# Patient Record
Sex: FEMALE | Race: WHITE | NOT HISPANIC OR LATINO | Employment: OTHER | URBAN - METROPOLITAN AREA
[De-identification: names, ages, dates, MRNs, and addresses within clinical notes are randomized per-mention and may not be internally consistent; named-entity substitution may affect disease eponyms.]

---

## 2017-03-01 NOTE — TELEPHONE ENCOUNTER
Refill done, patient needs an appointment to discuss better plan.   Her diabetes is NOT under control.  I called but there is no voice mail to leave a message.       Mainor Jane MD  Diplomat, [facility]  [address] [place] [phone]

## 2017-03-02 NOTE — TELEPHONE ENCOUNTER
Sent My chart message to patient    Message: My Chart message sent.    Left Message for patient to call back: yes

## 2017-04-04 RX ORDER — SIMVASTATIN 20 MG
TABLET ORAL
Qty: 90 TAB | Refills: 0 | Status: SHIPPED | OUTPATIENT
Start: 2017-04-04 | End: 2017-04-10 | Stop reason: SDUPTHER

## 2017-04-10 ENCOUNTER — OFFICE VISIT (OUTPATIENT)
Dept: MEDICAL GROUP | Age: 67
End: 2017-04-10
Payer: MEDICARE

## 2017-04-10 VITALS
TEMPERATURE: 98.2 F | OXYGEN SATURATION: 96 % | BODY MASS INDEX: 28.16 KG/M2 | WEIGHT: 153 LBS | SYSTOLIC BLOOD PRESSURE: 128 MMHG | HEIGHT: 62 IN | DIASTOLIC BLOOD PRESSURE: 76 MMHG | HEART RATE: 82 BPM

## 2017-04-10 DIAGNOSIS — Z78.0 POSTMENOPAUSAL: ICD-10-CM

## 2017-04-10 DIAGNOSIS — E78.00 HYPERCHOLESTEREMIA: ICD-10-CM

## 2017-04-10 DIAGNOSIS — Z12.31 VISIT FOR SCREENING MAMMOGRAM: ICD-10-CM

## 2017-04-10 DIAGNOSIS — E11.21 CONTROLLED TYPE 2 DIABETES MELLITUS WITH DIABETIC NEPHROPATHY, UNSPECIFIED LONG TERM INSULIN USE STATUS: ICD-10-CM

## 2017-04-10 DIAGNOSIS — Z00.00 PREVENTATIVE HEALTH CARE: ICD-10-CM

## 2017-04-10 DIAGNOSIS — E11.8 CONTROLLED DIABETES MELLITUS TYPE 2 WITH COMPLICATIONS, UNSPECIFIED LONG TERM INSULIN USE STATUS: ICD-10-CM

## 2017-04-10 DIAGNOSIS — E11.21 CONTROLLED TYPE 2 DIABETES MELLITUS WITH DIABETIC NEPHROPATHY, WITHOUT LONG-TERM CURRENT USE OF INSULIN (HCC): ICD-10-CM

## 2017-04-10 PROCEDURE — 99214 OFFICE O/P EST MOD 30 MIN: CPT | Performed by: FAMILY MEDICINE

## 2017-04-10 RX ORDER — SIMVASTATIN 20 MG
TABLET ORAL
Qty: 90 TAB | Refills: 0 | Status: SHIPPED | OUTPATIENT
Start: 2017-04-10 | End: 2017-07-14

## 2017-04-10 NOTE — MR AVS SNAPSHOT
"        Nery Brady   4/10/2017 5:00 PM   Office Visit   MRN: [id number]    Department:  [address] [place] [address]   Dept Phone:  [phone]    Description:  Female : 1950   Provider:  Magaly Jane M.D.           Allergies as of 4/10/2017     No Known Allergies      You were diagnosed with     Hypercholesteremia   [290186]       Controlled type 2 diabetes mellitus with diabetic nephropathy, unspecified long term insulin use status (CMS-HCC)   [2274031]       Postmenopausal   [948711]       Preventative health care   [799220]         Vital Signs     Blood Pressure Pulse Temperature Height Weight Body Mass Index    128/76 mmHg 82 36.8 °C (98.2 °F) 1.575 m (5' 2.01\") 69.4 kg (153 lb) 27.98 kg/m2    Oxygen Saturation Smoking Status                96% Never Smoker           Basic Information     Date Of Birth Sex Race Ethnicity Preferred Language    1950 Female White Non- English      Your appointments     Jul 10, 2017  8:40 AM   Established Patient with Magaly Jane M.D.   [address] [id number] [phone]           You will be receiving a confirmation call a few days before your appointment from our automated call confirmation system.              Problem List              ICD-10-CM Priority Class Noted - Resolved    Vitamin d deficiency    2010 - Present    Hypercholesteremia E78.00   2010 - Present    Osteopenia M85.80   2010 - Present    Abnormal mammogram of left breast R92.8   2015 - Present    Postmenopausal Z78.0   2016 - Present    Visit for screening mammogram Z12.31   2016 - Present    Need for prophylactic vaccination with combined vaccine Z23   2016 - Present    Diabetes mellitus type 2, controlled (CMS-HCC) E11.9   2016 - Present    Preventative health care Z00.00   4/10/2017 - Present      Health Maintenance        Date Due Completion Dates    RETINAL " SCREENING 5/4/1967 ---    IMM ZOSTER VACCINE 5/4/2009 ---    DIABETES MONOFILAMENT / LE EXAM 7/30/2014 7/30/2013    BONE DENSITY 8/23/2015 8/23/2010, 1/11/2008    URINE ACR / MICROALBUMIN 10/1/2015 10/1/2014, 4/25/2013, 2/9/2012, 8/3/2010    MAMMOGRAM 5/17/2016 11/17/2015, 5/13/2015, 1/11/2008, 1/11/2008, 9/20/2007, 9/20/2007, 4/25/2006, 5/12/2004    A1C SCREENING 4/14/2017 10/14/2016, 8/8/2016, 6/11/2015, 10/1/2014, 10/30/2013, 7/30/2013, 4/25/2013, 11/28/2012, 5/18/2012, 2/9/2012, 7/25/2011, 4/21/2011, 8/3/2010    FASTING LIPID PROFILE 10/14/2017 10/14/2016, 10/1/2014, 2/9/2012, 8/3/2010    SERUM CREATININE 10/14/2017 10/14/2016, 10/1/2014, 11/28/2012, 2/9/2012, 4/22/2005    COLONOSCOPY 10/2/2024 10/2/2014    IMM DTaP/Tdap/Td Vaccine (3 - Td) 6/23/2026 6/23/2016, 6/4/2013            Current Immunizations     13-VALENT PCV PREVNAR 10/10/2015    Hepatitis B Vaccine Recombivax (Adol/Adult)  Deferred (Patient Schedule)    Influenza TIV (IM) 10/10/2015, 11/14/2014    Pneumococcal polysaccharide vaccine (PPSV-23) 7/18/2014    Tdap Vaccine 6/23/2016, 6/4/2013  8:30 PM      Below and/or attached are the medications your provider expects you to take. Review all of your home medications and newly ordered medications with your provider and/or pharmacist. Follow medication instructions as directed by your provider and/or pharmacist. Please keep your medication list with you and share with your provider. Update the information when medications are discontinued, doses are changed, or new medications (including over-the-counter products) are added; and carry medication information at all times in the event of emergency situations     Allergies:  No Known Allergies          Medications  Valid as of: April 10, 2017 -  5:26 PM    Generic Name Brand Name Tablet Size Instructions for use    Aspirin (Chew Tab) ASA 81 MG Take 1 Tab by mouth every day.        GlipiZIDE (Tab) GLUCOTROL 10 MG TAKE 1 TABLET BY MOUTH TWICE A DAY         Lisinopril (Tab) PRINIVIL 5 MG TAKE 1 TABLET ORALLY EVERY DAY        MetFORMIN HCl (Tab) GLUCOPHAGE 500 MG Take 500 mg by mouth 2 times a day, with meals.        MetFORMIN HCl (Tab) GLUCOPHAGE 1000 MG TAKE 1 TABLET BY MOUTH TWICE A DAY WITH MEALS *DNFU 08/10        MetFORMIN HCl (Tab) GLUCOPHAGE 1000 MG         Misc. Devices (Misc) Misc. Devices  by Does not apply route. DIABETIC TEST STRIPS AND LANCETS    SIG:  USE AS DIRECTED        Simvastatin (Tab) ZOCOR 20 MG Take 1 Tab by mouth every evening.        Simvastatin (Tab) ZOCOR 20 MG TAKE 1 TABLET BY MOUTH EVERY DAY IN THE EVENING        Simvastatin (Tab) ZOCOR 20 MG TAKE ONE TABLET BY MOUTH ONCE DAILY IN THE EVENING        .                 Medicines prescribed today were sent to:     [facility] [address] [place] [address], [place] - [address]    [address] [place] [address]    Phone: [phone] Fax: [phone]    Open 24 Hours?: No      Medication refill instructions:       If your prescription bottle indicates you have medication refills left, it is not necessary to call your provider’s office. Please contact your pharmacy and they will refill your medication.    If your prescription bottle indicates you do not have any refills left, you may request refills at any time through one of the following ways: The online [email or web address] system (except Urgent Care), by calling your provider’s office, or by asking your pharmacy to contact your provider’s office with a refill request. Medication refills are processed only during regular business hours and may not be available until the next business day. Your provider may request additional information or to have a follow-up visit with you prior to refilling your medication.   *Please Note: Medication refills are assigned a new Rx number when refilled electronically. Your pharmacy may indicate that no refills were authorized even though a new prescription for the same medication is available at the pharmacy. Please  request the medicine by name with the pharmacy before contacting your provider for a refill.        Your To Do List     Future Labs/Procedures Complete By Expires    CBC WITH DIFFERENTIAL  As directed 4/10/2018    COMP METABOLIC PANEL  As directed 4/10/2018    DS-BONE DENSITY STUDY (DEXA)  As directed 4/10/2018    HEMOGLOBIN A1C  As directed 4/10/2018    LIPID PROFILE  As directed 4/10/2018    MICROALBUMIN CREAT RATIO URINE  As directed 4/10/2018         MyChart Access Code: Activation code not generated  Current Alerehart Status: Active

## 2017-04-11 RX ORDER — SIMVASTATIN 20 MG
20 TABLET ORAL
Qty: 90 TAB | Refills: 0 | Status: SHIPPED | OUTPATIENT
Start: 2017-04-11 | End: 2017-07-14

## 2017-04-11 RX ORDER — LISINOPRIL 5 MG/1
5 TABLET ORAL
Qty: 90 TAB | Refills: 0 | Status: SHIPPED | OUTPATIENT
Start: 2017-04-11 | End: 2018-06-29 | Stop reason: SDUPTHER

## 2017-04-11 NOTE — ASSESSMENT & PLAN NOTE
On a simvastatin 20mg po qhs  The patient has been  tolerating this fine. The patient denies any muscle aches, no abdominal pain and no history of elevated liver enzymes.  Results for CARLTON SANTOS (MRN [id number]) as of 4/10/2017 18:08   Ref. Range 8/8/2016 13:25 10/14/2016 11:12   Glycohemoglobin Latest Ref Range: 4.8-5.6 % 7.7 8.1 (H)

## 2017-04-11 NOTE — ASSESSMENT & PLAN NOTE
Taking metmorfin 1000mg po bid  Glipizide 10mg po bid  She is on a statin,  Did not start her daily baby aspirin Nor her lisinopril  Last HBA1c   The patient denies  any polydipsia, polyuria, polyphagia, weight loss, no numbness or tingling anywhere, no changes in vision.  Due for monofilament, urine microalbumin, and retinal screening    Results for CARLTON SANTOS TOVA (MRN [id number]) as of 4/10/2017 18:08   Ref. Range 8/8/2016 13:25 10/14/2016 11:12   Glycohemoglobin Latest Ref Range: 4.8-5.6 % 7.7 8.1 (H)

## 2017-04-11 NOTE — PROGRESS NOTES
This medical record contains text that has been entered with the assistance of computer voice recognition and dictation software.  Therefore, it may contain unintended errors in text, spelling, punctuation, or grammar    No chief complaint on file.      Nery Santos is a 66 y.o. female here evaluation and management of: diabetes, hld, routine visit      HPI:     Diabetes mellitus type 2, controlled  Taking metmorfin 1000mg po bid  Glipizide 10mg po bid  She is on a statin,  Did not start her daily baby aspirin Nor her lisinopril  Last HBA1c   The patient denies  any polydipsia, polyuria, polyphagia, weight loss, no numbness or tingling anywhere, no changes in vision.  Due for monofilament, urine microalbumin, and retinal screening    Results for NERY SANTOS (MRN [id number]) as of 4/10/2017 18:08   Ref. Range 8/8/2016 13:25 10/14/2016 11:12   Glycohemoglobin Latest Ref Range: 4.8-5.6 % 7.7 8.1 (H)       Hypercholesteremia  On a simvastatin 20mg po qhs  The patient has been  tolerating this fine. The patient denies any muscle aches, no abdominal pain and no history of elevated liver enzymes.  Results for NERY SANTOS (MRN [id number]) as of 4/10/2017 18:08   Ref. Range 8/8/2016 13:25 10/14/2016 11:12   Glycohemoglobin Latest Ref Range: 4.8-5.6 % 7.7 8.1 (H)       Postmenopausal  Due for Dexa scan  Is taking calcium supplementation    Current medicines (including changes today)  Current Outpatient Prescriptions   Medication Sig Dispense Refill   • metformin (GLUCOPHAGE) 1000 MG tablet      • simvastatin (ZOCOR) 20 MG Tab TAKE ONE TABLET BY MOUTH ONCE DAILY IN THE EVENING 90 Tab 0   • metformin (GLUCOPHAGE) 1000 MG tablet TAKE 1 TABLET BY MOUTH TWICE A DAY WITH MEALS *DNFU 08/10 180 Tab 0   • glipiZIDE (GLUCOTROL) 10 MG Tab TAKE 1 TABLET BY MOUTH TWICE A  Tab 0   • simvastatin (ZOCOR) 20 MG Tab TAKE 1 TABLET BY MOUTH EVERY DAY IN THE EVENING 90 Tab 0   • simvastatin (ZOCOR) 20 MG Tab Take 1  "Tab by mouth every evening. 90 Tab 0   • Misc. Devices MISC by Does not apply route. DIABETIC TEST STRIPS AND LANCETS    SIG:  USE AS DIRECTED 3 Each 11   • lisinopril (PRINIVIL) 5 MG Tab TAKE 1 TABLET ORALLY EVERY DAY (Patient not taking: Reported on 4/10/2017) 60 Tab 0   • aspirin (ASA) 81 MG Chew Tab chewable tablet Take 1 Tab by mouth every day. (Patient not taking: Reported on 4/10/2017) 100 Tab 1   • metformin (GLUCOPHAGE) 500 MG TABS Take 500 mg by mouth 2 times a day, with meals.       No current facility-administered medications for this visit.     She  has a past medical history of Hyperlipidemia and Diabetes.  She  has past surgical history that includes laparoscopy; tonsillectomy; and cholecystectomy.  Social History   Substance Use Topics   • Smoking status: Never Smoker    • Smokeless tobacco: Never Used   • Alcohol Use: 0.0 oz/week     0 Standard drinks or equivalent per week     Social History     Social History Narrative     Family History   Problem Relation Age of Onset   • Cancer Mother      melanoma     Family Status   Relation Status Death Age   • Mother     • Father     • Sister Alive    • Brother Alive          ROS  Please see hpi    All other systems reviewed and are negative     Objective:     Blood pressure 128/76, pulse 82, temperature 36.8 °C (98.2 °F), height 1.575 m (5' 2.01\"), weight 69.4 kg (153 lb), SpO2 96 %. Body mass index is 27.98 kg/(m^2).  Physical Exam:    Constitutional: Alert, no distress.  Skin: Warm, dry, good turgor, no rashes in visible areas.  Eye: Equal, round and reactive, conjunctiva clear, lids normal.  ENMT: Lips without lesions, good dentition, oropharynx clear.  Neck: Trachea midline, no masses, no thyromegaly. No cervical or supraclavicular lymphadenopathy.  Respiratory: Unlabored respiratory effort, lungs clear to auscultation, no wheezes, no ronchi.  Cardiovascular: Normal S1, S2, no murmur, no edema.  Abdomen: Soft, non-tender, no masses, no " hepatosplenomegaly.  Psych: Alert and oriented x3, normal affect and mood.  Monofilament testing with a 10 gram force: sensation intact: intact bilaterally  Visual Inspection: Feet without maceration, ulcers, fissures.  Pedal pulses: intact bilaterally        Assessment and Plan:   The following treatment plan was discussed, again this medical record contains text that has been entered with the assistance of computer voice recognition and dictation software.  Therefore, it may contain unintended errors in text, spelling, punctuation, or grammar      1. Hypercholesteremia  Patient has been stable with current management  We will make no changes for now  - simvastatin (ZOCOR) 20 MG Tab; TAKE ONE TABLET BY MOUTH ONCE DAILY IN THE EVENING  Dispense: 90 Tab; Refill: 0  - LIPID PROFILE; Future    2. Controlled type 2 diabetes mellitus with diabetic nephropathy, unspecified long term insulin use status (CMS-HCC)  Needs repeat labs  Due for retinal screening--will schedule at next visit  Monofilament done today  Not taking her lisinopril nor her aspirin  Is on statin    - HEMOGLOBIN A1C; Future  - MICROALBUMIN CREAT RATIO URINE; Future  - POCT Retinal Eye Exam    3. Postmenopausal    - DS-BONE DENSITY STUDY (DEXA); Future    4. Preventative health care  Due for repeat mammogram  Ordered  Due for Dexa--ordered    - COMP METABOLIC PANEL; Future  - CBC WITH DIFFERENTIAL; Future        Followup: Return in about 3 months (around 7/10/2017) for MA visit for Retinal screen.

## 2017-05-01 ENCOUNTER — PATIENT OUTREACH (OUTPATIENT)
Dept: HEALTH INFORMATION MANAGEMENT | Facility: OTHER | Age: 67
End: 2017-05-01

## 2017-05-01 NOTE — PROGRESS NOTES
Outcome: Left Message    WebIZ Checked & Epic Updated:  yes    HealthConnect Verified: no    Attempt # 1

## 2017-05-16 NOTE — PROGRESS NOTES
Outcome: CALL BACK IN A MONTH    WebIZ Checked & Epic Updated:  yes    HealthConnect Verified: yes    Attempt # 2

## 2017-06-30 RX ORDER — GLIPIZIDE 10 MG/1
TABLET ORAL
Qty: 180 TAB | Refills: 0 | Status: SHIPPED | OUTPATIENT
Start: 2017-06-30 | End: 2018-03-05 | Stop reason: SDUPTHER

## 2017-07-03 NOTE — PROGRESS NOTES
Attempt #:3    WebIZ Checked & Epic Updated: yes  HealthConnect Verified: no  Verify PCP: yes    Communication Preference Obtained: yes     Review Care Team: yes    Annual Wellness Visit Scheduling  1. Scheduling Status:Scheduled        Care Gap Scheduling (Attempt to Schedule EACH Overdue Care Gap!)     Health Maintenance Due   Topic Date Due   • Annual Wellness Visit  sched   • RETINAL SCREENING  Pt will bring in records   • IMM ZOSTER VACCINE  Pt will bring in records   • BONE DENSITY  Not at the [facility]   • MAMMOGRAM  Not at the moment         [employer] Activation: already active  Lifefactory Elvin: no  Virtual Visits: no  Opt In to Text Messages: no

## 2017-07-13 ENCOUNTER — TELEPHONE (OUTPATIENT)
Dept: MEDICAL GROUP | Age: 67
End: 2017-07-13

## 2017-07-13 NOTE — TELEPHONE ENCOUNTER
ESTABLISHED PATIENT PRE-VISIT PLANNING     Note: Patient will not be contacted if there is no indication to call.     1.  Reviewed notes from the last few office visits within the medical group: Yes    2.  If any orders were placed at last visit or intended to be done for this visit (i.e. 6 mos follow-up), do we have Results/Consult Notes?        •  Labs - Labs ordered, completed and results are in chart.       •  Imaging - Imaging was not ordered at last office visit.       •  Referrals - No referrals were ordered at last office visit.    3. Is this appointment scheduled as a Hospital Follow-Up? No    4.  Immunizations were updated in Epic using WebIZ?: Epic matches WebIZ       •  Web Iz Recommendations: FLU, HEPATITIS A , TD and ZOSTAVAX (Shingles)    5.  Patient is due for the following Health Maintenance Topics:   Health Maintenance Due   Topic Date Due   • Annual Wellness Visit  1950   • RETINAL SCREENING  05/04/1967   • IMM ZOSTER VACCINE  05/04/2009   • BONE DENSITY  08/23/2015   • MAMMOGRAM  05/17/2016           6.  Patient was informed to arrive 15 min prior to their scheduled appointment and bring in their medication bottles.

## 2017-07-14 ENCOUNTER — OFFICE VISIT (OUTPATIENT)
Dept: MEDICAL GROUP | Age: 67
End: 2017-07-14
Payer: MEDICARE

## 2017-07-14 VITALS
SYSTOLIC BLOOD PRESSURE: 122 MMHG | HEIGHT: 62 IN | WEIGHT: 150 LBS | BODY MASS INDEX: 27.6 KG/M2 | DIASTOLIC BLOOD PRESSURE: 60 MMHG | TEMPERATURE: 97 F | HEART RATE: 94 BPM | OXYGEN SATURATION: 96 %

## 2017-07-14 DIAGNOSIS — E11.21 CONTROLLED TYPE 2 DIABETES MELLITUS WITH DIABETIC NEPHROPATHY, WITHOUT LONG-TERM CURRENT USE OF INSULIN (HCC): ICD-10-CM

## 2017-07-14 DIAGNOSIS — Z78.0 POSTMENOPAUSAL: ICD-10-CM

## 2017-07-14 DIAGNOSIS — Z12.31 VISIT FOR SCREENING MAMMOGRAM: ICD-10-CM

## 2017-07-14 DIAGNOSIS — L82.0 INFLAMED SEBORRHEIC KERATOSIS: ICD-10-CM

## 2017-07-14 DIAGNOSIS — L57.0 AK (ACTINIC KERATOSIS): ICD-10-CM

## 2017-07-14 DIAGNOSIS — Z00.00 PREVENTATIVE HEALTH CARE: ICD-10-CM

## 2017-07-14 PROCEDURE — 17110 DESTRUCTION B9 LES UP TO 14: CPT | Performed by: FAMILY MEDICINE

## 2017-07-14 PROCEDURE — 17003 DESTRUCT PREMALG LES 2-14: CPT | Performed by: FAMILY MEDICINE

## 2017-07-14 PROCEDURE — 99214 OFFICE O/P EST MOD 30 MIN: CPT | Mod: 25 | Performed by: FAMILY MEDICINE

## 2017-07-14 PROCEDURE — 17000 DESTRUCT PREMALG LESION: CPT | Mod: 59 | Performed by: FAMILY MEDICINE

## 2017-07-14 ASSESSMENT — PATIENT HEALTH QUESTIONNAIRE - PHQ9: CLINICAL INTERPRETATION OF PHQ2 SCORE: 0

## 2017-07-14 NOTE — MR AVS SNAPSHOT
"        Nery Brady   2017 11:40 AM   Office Visit   MRN: [id number]    Department:  [address] [place] [address]   Dept Phone:  [phone]    Description:  Female : 1950   Provider:  Magaly Jane M.D.           Reason for Visit     Hyperlipidemia evaluation on blood work results       Allergies as of 2017     No Known Allergies      You were diagnosed with     Postmenopausal   [425530]       Visit for screening mammogram   [282640]       Preventative health care   [015034]       Controlled type 2 diabetes mellitus with diabetic nephropathy, without long-term current use of insulin (CMS-[facility])   [2733276]         Vital Signs     Blood Pressure Pulse Temperature Height Weight Body Mass Index    122/60 mmHg 94 36.1 °C (97 °F) 1.575 m (5' 2.01\") 68.04 kg (150 lb) 27.43 kg/m2    Oxygen Saturation Smoking Status                96% Never Smoker           Basic Information     Date Of Birth Sex Race Ethnicity Preferred Language    1950 Female White Non- English      Your appointments     2017  9:20 AM   ANNUAL EXAM PREVENTATIVE with KELLI Ivey   [facility])    [address]  [facility] [address] [phone]            Oct 17, 2017 11:40 AM   Established Patient with Magaly Jane M.D.   [address] [address] [phone]           You will be receiving a confirmation call a few days before your appointment from our automated call confirmation system.              Problem List              ICD-10-CM Priority Class Noted - Resolved    Vitamin d deficiency    2010 - Present    Hypercholesteremia E78.00   2010 - Present    Osteopenia M85.80   2010 - Present    Abnormal mammogram of left breast R92.8   2015 - Present    Postmenopausal Z78.0   2016 - Present    Visit for screening mammogram Z12.31   2016 - " Present    Need for prophylactic vaccination with combined vaccine Z23   8/8/2016 - Present    Diabetes mellitus type 2, controlled (CMS-HCC) E11.9   8/8/2016 - Present    Preventative health care Z00.00   4/10/2017 - Present      Health Maintenance        Date Due Completion Dates    RETINAL SCREENING 5/4/1967 ---    BONE DENSITY 8/23/2015 8/23/2010, 1/11/2008    MAMMOGRAM 5/17/2016 11/17/2015, 5/13/2015, 1/11/2008, 1/11/2008, 9/20/2007, 9/20/2007, 4/25/2006, 5/12/2004    IMM INFLUENZA (1) 9/1/2017 10/10/2015, 11/14/2014    A1C SCREENING 12/19/2017 6/19/2017, 10/14/2016, 8/8/2016, 6/11/2015, 10/1/2014, 10/30/2013, 7/30/2013, 4/25/2013, 11/28/2012, 5/18/2012, 2/9/2012, 7/25/2011, 4/21/2011, 8/3/2010    DIABETES MONOFILAMENT / LE EXAM 4/10/2018 4/10/2017 (Done), 7/30/2013    Override on 4/10/2017: Done    FASTING LIPID PROFILE 6/19/2018 6/19/2017, 10/14/2016, 10/1/2014, 2/9/2012, 8/3/2010    URINE ACR / MICROALBUMIN 6/19/2018 6/19/2017, 10/1/2014, 4/25/2013, 2/9/2012, 8/3/2010    SERUM CREATININE 6/19/2018 6/19/2017, 10/14/2016, 10/1/2014, 11/28/2012, 2/9/2012, 4/22/2005    COLONOSCOPY 10/2/2024 10/2/2014    IMM DTaP/Tdap/Td Vaccine (3 - Td) 6/23/2026 6/23/2016, 6/4/2013            Current Immunizations     13-VALENT PCV PREVNAR 10/10/2015    Hepatitis B Vaccine Recombivax (Adol/Adult)  Deferred (Patient Schedule)    Influenza TIV (IM) 10/10/2015, 11/14/2014    Pneumococcal polysaccharide vaccine (PPSV-23) 7/18/2014    SHINGLES VACCINE 7/14/2016    Tdap Vaccine 6/23/2016, 6/4/2013  8:30 PM      Below and/or attached are the medications your provider expects you to take. Review all of your home medications and newly ordered medications with your provider and/or pharmacist. Follow medication instructions as directed by your provider and/or pharmacist. Please keep your medication list with you and share with your provider. Update the information when medications are discontinued, doses are changed, or new medications  (including over-the-counter products) are added; and carry medication information at all times in the event of emergency situations     Allergies:  No Known Allergies          Medications  Valid as of: July 14, 2017 - 12:19 PM    Generic Name Brand Name Tablet Size Instructions for use    Aspirin (Chew Tab) ASA 81 MG Take 1 Tab by mouth every day.        GlipiZIDE (Tab) GLUCOTROL 10 MG TAKE 1 TABLET BY MOUTH TWICE A DAY        Lisinopril (Tab) PRINIVIL 5 MG Take 1 Tab by mouth every day.        MetFORMIN HCl (Tab) GLUCOPHAGE 1000 MG         MetFORMIN HCl (Tab) GLUCOPHAGE 1000 MG TAKE 1 TABLET BY MOUTH TWICE A DAY WITH MEALS *DNFU 08/10        Misc. Devices (Misc) Misc. Devices  by Does not apply route. DIABETIC TEST STRIPS AND LANCETS    SIG:  USE AS DIRECTED        Simvastatin (Tab) ZOCOR 20 MG Take 1 Tab by mouth every evening.        Simvastatin (Tab) ZOCOR 20 MG TAKE ONE TABLET BY MOUTH ONCE DAILY IN THE EVENING        Simvastatin (Tab) ZOCOR 20 MG Take 1 Tab by mouth every day.        SITagliptin Phosphate (Tab) JANUVIA 50 MG Take 1 Tab by mouth every day.        SITagliptin-MetFORMIN HCl (TABLET SR 24 HR) SitaGLIPtin-MetFORMIN HCl 100-1000 MG Take 1 Application by mouth 2 Times a Day.        .                 Medicines prescribed today were sent to:     [facility] [address] [place] [address], [place] - [address] [facility]    [address] [facility] [address]    Phone: [phone] Fax: [phone]    Open 24 Hours?: No      Medication refill instructions:       If your prescription bottle indicates you have medication refills left, it is not necessary to call your provider’s office. Please contact your pharmacy and they will refill your medication.    If your prescription bottle indicates you do not have any refills left, you may request refills at any time through one of the following ways: The online [employer] system (except Urgent Care), by calling your provider’s office, or by asking your pharmacy to contact your  provider’s office with a refill request. Medication refills are processed only during regular business hours and may not be available until the next business day. Your provider may request additional information or to have a follow-up visit with you prior to refilling your medication.   *Please Note: Medication refills are assigned a new Rx number when refilled electronically. Your pharmacy may indicate that no refills were authorized even though a new prescription for the same medication is available at the pharmacy. Please request the medicine by name with the pharmacy before contacting your provider for a refill.        Your To Do List     Future Labs/Procedures Complete By Expires    DS-BONE DENSITY STUDY (DEXA)  As directed 1/14/2018         [employer] Access Code: Activation code not generated  Current [employer] Pay Status: Active

## 2017-07-14 NOTE — ASSESSMENT & PLAN NOTE
68 y/o female with h/o uncontrolled DM presents for routine follow up.  She is due for mammogra, DEXA and retinal screening.   The patient denied any chest pain, no sob, no rust, no  pnd, no orthopnea, no headache, no changes in vision, no numbness or tingling, no nausea, no diarrhea, no abdominal pain, no fevers, no chills, no bright red blood per rectum, no  difficulty urinating, no burning during micturition, no depressed mood, no other concerns.      Trying to bike 3 miles daily sometimes 6 miles    Her   8 years ago, so she was no taking care of her self

## 2017-07-14 NOTE — ASSESSMENT & PLAN NOTE
Several well-demarcated, round or oval lesions with a dull, verrucous surface and a typical stuck-on appearance on back and chest

## 2017-07-14 NOTE — PROGRESS NOTES
This medical record contains text that has been entered with the assistance of computer voice recognition and dictation software.  Therefore, it may contain unintended errors in text, spelling, punctuation, or grammar    Chief Complaint   Patient presents with   • Hyperlipidemia     evaluation on blood work results        Nery Santos is a 67 y.o. female here evaluation and management of: Uncontrolled diabetes mellitus, health maintenance, ISK      HPI:     Preventative health care  68 y/o female with h/o uncontrolled DM presents for routine follow up.  She is due for mammogra, DEXA and retinal screening.   The patient denied any chest pain, no sob, no rust, no  pnd, no orthopnea, no headache, no changes in vision, no numbness or tingling, no nausea, no diarrhea, no abdominal pain, no fevers, no chills, no bright red blood per rectum, no  difficulty urinating, no burning during micturition, no depressed mood, no other concerns.      Trying to bike 3 miles daily sometimes 6 miles    Her   8 years ago, so she was no taking care of her self      Diabetes mellitus type 2, controlled  Metformin 1000 mg by mouth twice a day  Glipizide 10 mg by mouth twice a day  She is on simvastatin 20 mg  She was prescribed an ACE inhibitor but did not started yet  She was prescribed aspirin but did not start and    She denied any polyuria, no polydipsia, no polyphagia, no weight loss, no numbness or tingling anywhere, no change in vision, also checks feet meticulously daily for ulcerations.    Retinal screening  Due   microalbumin  Up to date   Up to date on vaccinations  Up to date    Results for NERY SANTOS (MRN [id number]) as of 2017 12:39   Ref. Range 10/14/2016 11:12 2017 11:11   Glycohemoglobin Latest Ref Range: 4.8-5.6 % 8.1 (H) 8.1 (H)       Inflamed seborrheic keratosis    Several well-demarcated, round or oval lesions with a dull, verrucous surface and a typical stuck-on appearance on back  and chest        Postmenopausal  Patient is to refer DEXA scan  She has been taking calcium and vitamin D supplementation  Her form of exercise is  bicycling 3-6 miles daily      Current medicines (including changes today)  Current Outpatient Prescriptions   Medication Sig Dispense Refill   • sitagliptin (JANUVIA) 50 MG Tab Take 1 Tab by mouth every day. 90 Tab 0   • SitaGLIPtin-MetFORMIN HCl 100-1000 MG TABLET SR 24 HR Take 1 Application by mouth 2 Times a Day. 180 Tab 0   • glipiZIDE (GLUCOTROL) 10 MG Tab TAKE 1 TABLET BY MOUTH TWICE A  Tab 0   • lisinopril (PRINIVIL) 5 MG Tab Take 1 Tab by mouth every day. 90 Tab 0   • simvastatin (ZOCOR) 20 MG Tab Take 1 Tab by mouth every evening. 90 Tab 0   • metformin (GLUCOPHAGE) 1000 MG tablet TAKE 1 TABLET BY MOUTH TWICE A DAY WITH MEALS *[facility] 08/10 180 Tab 0   • metformin (GLUCOPHAGE) 1000 MG tablet      • aspirin (ASA) 81 MG Chew Tab chewable tablet Take 1 Tab by mouth every day. (Patient not taking: Reported on 4/10/2017) 100 Tab 1   • Misc. Devices MISC by Does not apply route. DIABETIC TEST STRIPS AND LANCETS    SIG:  USE AS DIRECTED 3 Each 11     No current facility-administered medications for this visit.     She  has a past medical history of Hyperlipidemia and Diabetes.  She  has past surgical history that includes laparoscopy; tonsillectomy; and cholecystectomy.  Social History   Substance Use Topics   • Smoking status: Never Smoker    • Smokeless tobacco: Never Used   • Alcohol Use: 0.0 oz/week     0 Standard drinks or equivalent per week     Social History     Social History Narrative     Family History   Problem Relation Age of Onset   • Cancer Mother      melanoma     Family Status   Relation Status Death Age   • Mother     • Father     • Sister Alive    • Brother Alive          ROS  Please see hpi    All other systems reviewed and are negative     Objective:     Blood pressure 122/60, pulse 94, temperature 36.1 °C (97 °F), height 1.575  "m (5' 2.01\"), weight 68.04 kg (150 lb), SpO2 96 %. Body mass index is 27.43 kg/(m^2).  Physical Exam:    Constitutional: Alert, no distress.  Skin: Warm, dry, good turgor, no rashes in visible areas.    Several well-demarcated, round or oval lesions with a dull, verrucous surface and a typical stuck-on appearance on back and chest    multiple lesions on bilateral forearms, hands, and chest, with evidence of of solar damage present , spotty hyperpigmentation, scattered telangiectasias, and  Xerosis      PROCEDURE: CRYOTHERAPY  Discussed risks and benefits of cryotherapy. Patient verbally agreed. 2 applications of cryotherapy were applied to all lesions 13 AK's and  4SK's. Patient tolerated procedure well. Aftercare instructions given.      Eye: Equal, round and reactive, conjunctiva clear, lids normal.  ENMT: Lips without lesions, good dentition, oropharynx clear.  Neck: Trachea midline, no masses, no thyromegaly. No cervical or supraclavicular lymphadenopathy.  Respiratory: Unlabored respiratory effort, lungs clear to auscultation, no wheezes, no ronchi.  Cardiovascular: Normal S1, S2, no murmur, no edema.  Abdomen: Soft, non-tender, no masses, no hepatosplenomegaly.  Psych: Alert and oriented x3, normal affect and mood.          Assessment and Plan:   The following treatment plan was discussed      1. Controlled type 2 diabetes mellitus with diabetic nephropathy, without long-term current use of insulin (CMS-[facility])  We will change diabetic medications to Janumet 50/1000 by mouth twice a day  Repeat A1c in 3 months      - sitagliptin (JANUVIA) 50 MG Tab; Take 1 Tab by mouth every day.  Dispense: 90 Tab; Refill: 0  - SitaGLIPtin-MetFORMIN HCl 100-1000 MG TABLET SR 24 HR; Take 1 Application by mouth 2 Times a Day.  Dispense: 180 Tab; Refill: 0    2. Visit for screening mammogram  Due for mammogram    - MA-SCREEN MAMMO W/CAD-BILAT    3. [facility] 10-year CHD Risk Score: >20% (Diabetic)   Values " used to calculate score:     Age: 67 years -- Points: 12     Total Cholesterol: 134 mg/dL -- Points: 0     HDL Cholesterol: 49 mg/dL -- Points: 1     Systolic BP (untreated): 122 mmHg -- Points: 1     The patient is not a smoker. -- Points: 0     The patient has a diagnosis of diabetes. -- Points: >20% Risk     The patient does not have a family history of CHD. -- Points:0       4. Controlled type 2 diabetes mellitus with diabetic nephropathy, without long-term current use of insulin (CMS-[facility])  We will change diabetic medications to Janumet 50/1000 by mouth twice a day  Repeat A1c in 3 months      - sitagliptin (JANUVIA) 50 MG Tab; Take 1 Tab by mouth every day.  Dispense: 90 Tab; Refill: 0  - SitaGLIPtin-MetFORMIN HCl 100-1000 MG TABLET SR 24 HR; Take 1 Application by mouth 2 Times a Day.  Dispense: 180 Tab; Refill: 0    5. Inflamed seborrheic keratosis  Patient tollerrated procedure well  There were no adverse events  Patient was given post procedure precautions       6. AK (actinic keratosis)  Patient tollerrated procedure well  There were no adverse events  Patient was given post procedure precautions       7. Postmenopausal  Due for Dexa    - DS-BONE DENSITY STUDY (DEXA); Future    HEALTH MAINTENANCE: due for retinal, DEXA, mammogram    Instructed to Follow up in clinic or ER for worsening symptoms, difficulty breathing, lack of expected recovery, or should new symptoms or problems arise.    Followup: Return in about 4 weeks (around 8/11/2017) for Reevaluation, Cryotherapy.       Once again this medical record contains text that has been entered with the assistance of computer voice recognition and dictation software.  Therefore, it may contain unintended errors in text, spelling, punctuation, or grammar

## 2017-07-14 NOTE — ASSESSMENT & PLAN NOTE
Metformin 1000 mg by mouth twice a day  Glipizide 10 mg by mouth twice a day  She is on simvastatin 20 mg  She was prescribed an ACE inhibitor but did not started yet  She was prescribed aspirin but did not start and    She denied any polyuria, no polydipsia, no polyphagia, no weight loss, no numbness or tingling anywhere, no change in vision, also checks feet meticulously daily for ulcerations.    Retinal screening  Due   microalbumin  Up to date   Up to date on vaccinations  Up to date    Results for CARLTON SANTOS (MRN [id number]) as of 7/14/2017 12:39   Ref. Range 10/14/2016 11:12 6/19/2017 11:11   Glycohemoglobin Latest Ref Range: 4.8-5.6 % 8.1 (H) 8.1 (H)

## 2017-07-14 NOTE — Clinical Note
[facility]  Magaly Jane M.D.  [address] [facility] [place] [address]  Fax: [phone]   Authorization for Release/Disclosure of   Protected Health Information   Name: NERY SANTOS : 1950 SSN: [id number]   Address: [address] [place] [address]  [place] [address] Phone:    [phone] (home)    I authorize the entity listed below to release/disclose the PHI below to:   [facility]/Magaly Jane M.D. and Magaly Jaen M.D.   Provider or Entity Name:     Address   City, State, Zip   Phone:      Fax:     Reason for request: continuity of care   Information to be released:    [  ] LAST COLONOSCOPY,  including any PATH REPORT and follow-up  [  ] LAST FIT/COLOGUARD RESULT [  ] LAST DEXA  [  ] LAST MAMMOGRAM  [  ] LAST PAP  [  ] LAST LABS [  ] RETINA EXAM REPORT  [  ] IMMUNIZATION RECORDS  [  ] Release all info      [  ] Check here and initial the line next to each item to release ALL health information INCLUDING  _____ Care and treatment for drug and / or alcohol abuse  _____ HIV testing, infection status, or AIDS  _____ Genetic Testing    DATES OF SERVICE OR TIME PERIOD TO BE DISCLOSED: _____________  I understand and acknowledge that:  * This Authorization may be revoked at any time by you in writing, except if your health information has already been used or disclosed.  * Your health information that will be used or disclosed as a result of you signing this authorization could be re-disclosed by the recipient. If this occurs, your re-disclosed health information may no longer be protected by State or Federal laws.  * You may refuse to sign this Authorization. Your refusal will not affect your ability to obtain treatment.  * This Authorization becomes effective upon signing and will  on (date) __________.      If no date is indicated, this Authorization will  one (1) year from the signature date.    Name: Nery Santos    Signature:   Date:     2017          PLEASE FAX REQUESTED RECORDS BACK TO: [phone]

## 2017-07-14 NOTE — ASSESSMENT & PLAN NOTE
Patient is to refer DEXA scan  She has been taking calcium and vitamin D supplementation  Her form of exercise is  bicycling 3-6 miles daily

## 2017-07-18 ENCOUNTER — OFFICE VISIT (OUTPATIENT)
Dept: MEDICAL GROUP | Facility: MEDICAL CENTER | Age: 67
End: 2017-07-18
Payer: MEDICARE

## 2017-07-18 VITALS
HEART RATE: 74 BPM | DIASTOLIC BLOOD PRESSURE: 68 MMHG | HEIGHT: 62 IN | WEIGHT: 149 LBS | SYSTOLIC BLOOD PRESSURE: 120 MMHG | BODY MASS INDEX: 27.42 KG/M2 | TEMPERATURE: 96.4 F | OXYGEN SATURATION: 96 %

## 2017-07-18 DIAGNOSIS — L57.0 AK (ACTINIC KERATOSIS): ICD-10-CM

## 2017-07-18 DIAGNOSIS — Z00.00 PREVENTATIVE HEALTH CARE: ICD-10-CM

## 2017-07-18 DIAGNOSIS — Z78.0 POSTMENOPAUSAL: ICD-10-CM

## 2017-07-18 DIAGNOSIS — Z12.31 VISIT FOR SCREENING MAMMOGRAM: ICD-10-CM

## 2017-07-18 DIAGNOSIS — L82.0 INFLAMED SEBORRHEIC KERATOSIS: ICD-10-CM

## 2017-07-18 DIAGNOSIS — R92.8 ABNORMAL MAMMOGRAM OF LEFT BREAST: ICD-10-CM

## 2017-07-18 DIAGNOSIS — Z00.00 MEDICARE ANNUAL WELLNESS VISIT, INITIAL: Primary | ICD-10-CM

## 2017-07-18 DIAGNOSIS — E11.21 CONTROLLED TYPE 2 DIABETES MELLITUS WITH DIABETIC NEPHROPATHY, WITHOUT LONG-TERM CURRENT USE OF INSULIN (HCC): ICD-10-CM

## 2017-07-18 DIAGNOSIS — Z23 NEED FOR PROPHYLACTIC VACCINATION WITH COMBINED VACCINE: ICD-10-CM

## 2017-07-18 DIAGNOSIS — E78.00 HYPERCHOLESTEREMIA: ICD-10-CM

## 2017-07-18 PROCEDURE — G0438 PPPS, INITIAL VISIT: HCPCS | Mod: 25 | Performed by: NURSE PRACTITIONER

## 2017-07-18 ASSESSMENT — PATIENT HEALTH QUESTIONNAIRE - PHQ9: CLINICAL INTERPRETATION OF PHQ2 SCORE: 0

## 2017-07-18 NOTE — ASSESSMENT & PLAN NOTE
Pt will be calling for appt for both mammo and DEXA, has order and phone number  Asymptomatic re menopausal sxs

## 2017-07-18 NOTE — MR AVS SNAPSHOT
"        Nery Brady   2017 9:20 AM   Office Visit   MRN: [id number]    Department:  [facility]   Dept Phone:  [phone]    Description:  Female : 1950   Provider:  KELLI Ivey           Reason for Visit     Annual Exam initial      Allergies as of 2017     No Known Allergies      You were diagnosed with     Medicare annual wellness visit, initial   [991370]  -  Primary     Visit for screening mammogram   [895360]       Preventative health care   [056870]       Postmenopausal   [691142]       Need for prophylactic vaccination with combined vaccine   [915742]       Inflamed seborrheic keratosis   [702.11.ICD-9-CM]       Hypercholesteremia   [790814]       Controlled type 2 diabetes mellitus with diabetic nephropathy, without long-term current use of insulin (CMS-[facility])   [0842777]       AK (actinic keratosis)   [028767]       Abnormal mammogram of left breast   [5537923]         Vital Signs     Blood Pressure Pulse Temperature Height Weight Body Mass Index    120/68 mmHg 74 35.8 °C (96.4 °F) 1.575 m (5' 2\") 67.586 kg (149 lb) 27.25 kg/m2    Oxygen Saturation Smoking Status                96% Never Smoker           Basic Information     Date Of Birth Sex Race Ethnicity Preferred Language    1950 Female White Non- English      Your appointments     Oct 17, 2017 11:40 AM   Established Patient with Magaly Jane M.D.   [address] [id number] [phone]           You will be receiving a confirmation call a few days before your appointment from our automated call confirmation system.              Problem List              ICD-10-CM Priority Class Noted - Resolved    Vitamin d deficiency    2010 - Present    Hypercholesteremia E78.00   2010 - Present    Osteopenia M85.80   2010 - Present    Postmenopausal Z78.0   2016 - Present    Diabetes mellitus type 2, controlled " (CMS-HCC) E11.9   8/8/2016 - Present    Preventative health care Z00.00   4/10/2017 - Present    Inflamed seborrheic keratosis L82.0   7/14/2017 - Present    AK (actinic keratosis) L57.0   7/14/2017 - Present      Health Maintenance        Date Due Completion Dates    RETINAL SCREENING 5/4/1967 ---    BONE DENSITY 8/23/2015 8/23/2010, 1/11/2008    MAMMOGRAM 5/17/2016 11/17/2015, 5/13/2015, 1/11/2008, 1/11/2008, 9/20/2007, 9/20/2007, 4/25/2006, 5/12/2004    IMM INFLUENZA (1) 9/1/2017 10/10/2015, 11/14/2014    A1C SCREENING 12/19/2017 6/19/2017, 10/14/2016, 8/8/2016, 6/11/2015, 10/1/2014, 10/30/2013, 7/30/2013, 4/25/2013, 11/28/2012, 5/18/2012, 2/9/2012, 7/25/2011, 4/21/2011, 8/3/2010    DIABETES MONOFILAMENT / LE EXAM 4/10/2018 4/10/2017 (Done), 7/30/2013    Override on 4/10/2017: Done    FASTING LIPID PROFILE 6/19/2018 6/19/2017, 10/14/2016, 10/1/2014, 2/9/2012, 8/3/2010    URINE ACR / MICROALBUMIN 6/19/2018 6/19/2017, 10/1/2014, 4/25/2013, 2/9/2012, 8/3/2010    SERUM CREATININE 6/19/2018 6/19/2017, 10/14/2016, 10/1/2014, 11/28/2012, 2/9/2012, 4/22/2005    COLONOSCOPY 10/2/2024 10/2/2014    IMM DTaP/Tdap/Td Vaccine (3 - Td) 6/23/2026 6/23/2016, 6/4/2013            Current Immunizations     13-VALENT PCV PREVNAR 10/10/2015    Hepatitis B Vaccine Recombivax (Adol/Adult)  Deferred (Patient Schedule)    Influenza TIV (IM) 10/10/2015, 11/14/2014    Pneumococcal polysaccharide vaccine (PPSV-23) 7/18/2014    SHINGLES VACCINE 7/14/2016    Tdap Vaccine 6/23/2016, 6/4/2013  8:30 PM      Below and/or attached are the medications your provider expects you to take. Review all of your home medications and newly ordered medications with your provider and/or pharmacist. Follow medication instructions as directed by your provider and/or pharmacist. Please keep your medication list with you and share with your provider. Update the information when medications are discontinued, doses are changed, or new medications (including  over-the-counter products) are added; and carry medication information at all times in the event of emergency situations     Allergies:  No Known Allergies          Medications  Valid as of: July 18, 2017 - 10:30 AM    Generic Name Brand Name Tablet Size Instructions for use    Calcium Acetate (Phos Binder) (Cap) PHOS- MG Take 1,334 mg by mouth 3 times a day, with meals.        GlipiZIDE (Tab) GLUCOTROL 10 MG TAKE 1 TABLET BY MOUTH TWICE A DAY        Lisinopril (Tab) PRINIVIL 5 MG Take 1 Tab by mouth every day.        MetFORMIN HCl (Tab) GLUCOPHAGE 1000 MG TAKE 1 TABLET BY MOUTH TWICE A DAY WITH MEALS *[facility] 08/10        Misc. Devices (Misc) Misc. Devices  by Does not apply route. DIABETIC TEST STRIPS AND LANCETS    SIG:  USE AS DIRECTED        Simvastatin (Tab) ZOCOR 20 MG Take 1 Tab by mouth every evening.        SITagliptin Phosphate (Tab) JANUVIA 50 MG Take 1 Tab by mouth every day.        SITagliptin-MetFORMIN HCl (TABLET SR 24 HR) SitaGLIPtin-MetFORMIN HCl 100-1000 MG Take 1 Application by mouth 2 Times a Day.        .                 Medicines prescribed today were sent to:     [facility] [address] [place] [address], [place] - [address]    [address] [facility] [place] [address]    Phone: [phone] Fax: [phone]    Open 24 Hours?: No      Medication refill instructions:       If your prescription bottle indicates you have medication refills left, it is not necessary to call your provider’s office. Please contact your pharmacy and they will refill your medication.    If your prescription bottle indicates you do not have any refills left, you may request refills at any time through one of the following ways: The online [employer] system (except Urgent Care), by calling your provider’s office, or by asking your pharmacy to contact your provider’s office with a refill request. Medication refills are processed only during regular business hours and may not be available until the next business day. Your  provider may request additional information or to have a follow-up visit with you prior to refilling your medication.   *Please Note: Medication refills are assigned a new Rx number when refilled electronically. Your pharmacy may indicate that no refills were authorized even though a new prescription for the same medication is available at the pharmacy. Please request the medicine by name with the pharmacy before contacting your provider for a refill.        Instructions    Continue with care through Magaly Jane M.D..  Next Medicare Annual Wellness Visit is due in one year.    Continue care with specialists you are seeing for your chronic problems.    Attached is some preventative information for you to read.          Fall Prevention and Home Safety  Falls cause injuries and can affect all age groups. It is possible to prevent falls.   HOW TO PREVENT FALLS  · Wear shoes with rubber soles that do not have an opening for your toes.   · Keep the inside and outside of your house well lit.   · Use night lights throughout your home.   · Remove clutter from floors.   · Clean up floor spills.   · Remove throw rugs or fasten them to the floor with carpet tape.   · Do not place electrical cords across pathways.   · Put grab bars by your tub, shower, and toilet. Do not use towel bars as grab bars.   · Put handrails on both sides of the stairway. Fix loose handrails.   · Do not climb on stools or stepladders, if possible.   · Do not wax your floors.   · Repair uneven or unsafe sidewalks, walkways, or stairs.   · Keep items you use a lot within reach.   · Be aware of pets.   · Keep emergency numbers next to the telephone.   · Put smoke detectors in your home and near bedrooms.   Ask your doctor what other things you can do to prevent falls.  Document Released: 10/14/2010 Document Revised: 06/18/2013 Document Reviewed: 03/19/2013  ExitCare® Patient Information ©2013 [employer].    Exercise to Stay  Healthy      Exercise helps you become and stay healthy.  EXERCISE IDEAS AND TIPS  Choose exercises that:  · You enjoy.   · Fit into your day.   You do not need to exercise really hard to be healthy. You can do exercises at a slow or medium level and stay healthy. You can:  · Stretch before and after working out.   · Try yoga, Pilates, or delfina chi.   · Lift weights.   · Walk fast, swim, jog, run, climb stairs, bicycle, dance, or rollerskate.   · Take aerobic classes.   Exercises that burn about 150 calories:  · Running 1 ½ miles in 15 minutes.   · Playing volleyball for 45 to 60 minutes.   · Washing and waxing a car for 45 to 60 minutes.   · Playing touch football for 45 minutes.   · Walking 1 ¾ miles in 35 minutes.   · Pushing a stroller 1 ½ miles in 30 minutes.   · Playing basketball for 30 minutes.   · Raking leaves for 30 minutes.   · Bicycling 5 miles in 30 minutes.   · Walking 2 miles in 30 minutes.   · Dancing for 30 minutes.   · Shoveling snow for 15 minutes.   · Swimming laps for 20 minutes.   · Walking up stairs for 15 minutes.   · Bicycling 4 miles in 15 minutes.   · Gardening for 30 to 45 minutes.   · Jumping rope for 15 minutes.   · Washing windows or floors for 45 to 60 minutes.     One suggestion is to start walking for 10 minutes after each meal.  This will help with digestion and help you to metabolize your meal.       For Weight Loss -   Recommend portion sizes with more fruits/vegetables/high fiber foods.  Stay away from white bread/pastas/white rice/white potatoes.  Increase Fluid intake to 6-8 glasses of water daily.   Eliminate soda's (diet and regular) from your fluid intake.      Document Released: 01/20/2012 Document Revised: 03/11/2013 Document Reviewed: 01/20/2012  ExitCare® Patient Information ©2013 [employer].    Fat and Cholesterol Control Diet  Cholesterol is a wax-like substance. It comes from your liver and is found in certain foods. There is good (HDL) and bad (LDL) cholesterol.  Too much cholesterol in your blood can affect your heart. Certain foods can lower or raise your cholesterol. Eat foods that are low in cholesterol.  Saturated and trans fats are bad fats found in foods that will raise your cholesterol. Do not eat foods that are high in saturated and trans fats.  FOODS HIGHER IN SATURATED AND TRANS FATS  · Dairy products, such as whole milk, eggs, cheese, cream, and butter.   · Fatty meats, such as hot dogs, sausage, and salami.   · Fried foods.   · Trans fats which are found in margarine and pre-made cookies, crackers, and baked goods.   · Tropical oils, such as coconut and palm oils.   Read package labels at the store. Do not buy products that use saturated or trans fats or hydrogenated oils. Find foods labeled:  · Low-fat.   · Low-saturated fat.   · Trans-fat-free.   · Low-cholesterol.   FOODS LOWER IN CHOLESTEROL  ·  Fruit.   · Vegetables.   · Beans, peas, and lentils.   · Fish.   · Lean meat, such as chicken (without skin) or ground turkey.   · Grains, such as barley, rice, couscous, bulgur wheat, and pasta.   · Heart-healthy tub margarine.   PREPARING YOUR FOOD  · Broil, bake, steam, or roast foods. Do not sarmiento food.   · Use non-stick cooking sprays.   · Use lemon or herbs to flavor food instead of using butter or stick margarine.   · Use nonfat yogurt, salsa, or low-fat dressings for salads.   Document Released: 06/18/2013 Document Reviewed: 06/18/2013  ExitCare® Patient Information ©2013 [email or web address] [facility].    Recommend annual flu vaccine.  Next due in Fall, 2015.  If you decide not to have the flu vaccine, recommend good handwashing and staying out of crowds during flu season.        Basic Carbohydrate Counting for Diabetes Mellitus  Carbohydrate counting is a method for keeping track of the amount of carbohydrates you eat. Eating carbohydrates naturally increases the level of sugar (glucose) in your blood, so it is important for you to know the amount that is okay for you to have  "in every meal. Carbohydrate counting helps keep the level of glucose in your blood within normal limits. The amount of carbohydrates allowed is different for every person. A dietitian can help you calculate the amount that is right for you. Once you know the amount of carbohydrates you can have, you can count the carbohydrates in the foods you want to eat.  Carbohydrates are found in the following foods:  · Grains, such as breads and cereals.  · Dried beans and soy products.  · Starchy vegetables, such as potatoes, peas, and corn.  · Fruit and fruit juices.  · Milk and yogurt.  · Sweets and snack foods, such as cake, cookies, candy, chips, soft drinks, and fruit drinks.  CARBOHYDRATE COUNTING  There are two ways to count the carbohydrates in your food. You can use either of the methods or a combination of both.  Reading the \"Nutrition Facts\" on Packaged Food  The \"Nutrition Facts\" is an area that is included on the labels of almost all packaged food and beverages in the [place]. It includes the serving size of that food or beverage and information about the nutrients in each serving of the food, including the grams (g) of carbohydrate per serving.   Decide the number of servings of this food or beverage that you will be able to eat or drink. Multiply that number of servings by the number of grams of carbohydrate that is listed on the label for that serving. The total will be the amount of carbohydrates you will be having when you eat or drink this food or beverage.  Learning Standard Serving Sizes of Food  When you eat food that is not packaged or does not include \"Nutrition Facts\" on the label, you need to measure the servings in order to count the amount of carbohydrates. A serving of most carbohydrate-rich foods contains about 15 g of carbohydrates. The following list includes serving sizes of carbohydrate-rich foods that provide 15 g of carbohydrate per serving:    · 1 slice of bread (1 oz) or 1 six-inch " tortilla.    · ½ of a hamburger bun or English muffin.  · 4-6 crackers.  · ¾ cup unsweetened dry cereal.    · ½ cup hot cereal.  ·  cup rice or pasta.    · ½ cup mashed potatoes or ¼ of a large baked potato.  · 1 cup fresh fruit or one small piece of fruit.    · ½ cup canned or frozen fruit or fruit juice.  · 1 cup milk.  ·  cup plain fat-free yogurt or yogurt sweetened with artificial sweeteners.  · ½ cup cooked dried beans or starchy vegetable, such as peas, corn, or potatoes.    Decide the number of standard-size servings that you will eat. Multiply that number of servings by 15 (the grams of carbohydrates in that serving). For example, if you eat 2 cups of strawberries, you will have eaten 2 servings and 30 g of carbohydrates (2 servings x 15 g = 30 g). For foods such as soups and casseroles, in which more than one food is mixed in, you will need to count the carbohydrates in each food that is included.  EXAMPLE OF CARBOHYDRATE COUNTING  Sample Dinner   · 3 oz chicken breast.  ·  cup of brown rice.  · ½ cup of corn.  · 1 cup milk.    · 1 cup strawberries with sugar-free whipped topping.    Carbohydrate Calculation   Step 1: Identify the foods that contain carbohydrates:   · Rice.    · Corn.    · Milk.    · Strawberries.  Step 2: Calculate the number of servings eaten of each:   · 2 servings of rice.    · 1 serving of corn.    · 1 serving of milk.    · 1 serving of strawberries.  Step 3:  Multiply each of those number of servings by 15 g:   · 2 servings of rice x 15 g = 30 g.    · 1 serving of corn x 15 g = 15 g.    · 1 serving of milk x 15 g = 15 g.    · 1 serving of strawberries x 15 g = 15 g.  Step 4: Add together all of the amounts to find the total grams of carbohydrates eaten: 30 g + 15 g + 15 g + 15 g = 75 g.     This information is not intended to replace advice given to you by your health care provider. Make sure you discuss any questions you have with your health care provider.     Document Released:  12/18/2006 Document Revised: 01/08/2016 Document Reviewed: 11/14/2014  Elsevier Interactive Patient Education ©2016 "[employer]            MyChart Access Code: Activation code not generated  Current Cardleyt Status: Active

## 2017-07-18 NOTE — PATIENT INSTRUCTIONS
Continue with care through Magaly Jane M.D..  Next Medicare Annual Wellness Visit is due in one year.    Continue care with specialists you are seeing for your chronic problems.    Attached is some preventative information for you to read.          Fall Prevention and Home Safety  Falls cause injuries and can affect all age groups. It is possible to prevent falls.   HOW TO PREVENT FALLS  · Wear shoes with rubber soles that do not have an opening for your toes.   · Keep the inside and outside of your house well lit.   · Use night lights throughout your home.   · Remove clutter from floors.   · Clean up floor spills.   · Remove throw rugs or fasten them to the floor with carpet tape.   · Do not place electrical cords across pathways.   · Put grab bars by your tub, shower, and toilet. Do not use towel bars as grab bars.   · Put handrails on both sides of the stairway. Fix loose handrails.   · Do not climb on stools or stepladders, if possible.   · Do not wax your floors.   · Repair uneven or unsafe sidewalks, walkways, or stairs.   · Keep items you use a lot within reach.   · Be aware of pets.   · Keep emergency numbers next to the telephone.   · Put smoke detectors in your home and near bedrooms.   Ask your doctor what other things you can do to prevent falls.  Document Released: 10/14/2010 Document Revised: 06/18/2013 Document Reviewed: 03/19/2013  ExitCare® Patient Information ©2013 [employer].    Exercise to Stay Healthy      Exercise helps you become and stay healthy.  EXERCISE IDEAS AND TIPS  Choose exercises that:  · You enjoy.   · Fit into your day.   You do not need to exercise really hard to be healthy. You can do exercises at a slow or medium level and stay healthy. You can:  · Stretch before and after working out.   · Try yoga, Pilates, or delfina chi.   · Lift weights.   · Walk fast, swim, jog, run, climb stairs, bicycle, dance, or rollerskate.   · Take aerobic classes.   Exercises that burn  about 150 calories:  · Running 1 ½ miles in 15 minutes.   · Playing volleyball for 45 to 60 minutes.   · Washing and waxing a car for 45 to 60 minutes.   · Playing touch football for 45 minutes.   · Walking 1 ¾ miles in 35 minutes.   · Pushing a stroller 1 ½ miles in 30 minutes.   · Playing basketball for 30 minutes.   · Raking leaves for 30 minutes.   · Bicycling 5 miles in 30 minutes.   · Walking 2 miles in 30 minutes.   · Dancing for 30 minutes.   · Shoveling snow for 15 minutes.   · Swimming laps for 20 minutes.   · Walking up stairs for 15 minutes.   · Bicycling 4 miles in 15 minutes.   · Gardening for 30 to 45 minutes.   · Jumping rope for 15 minutes.   · Washing windows or floors for 45 to 60 minutes.     One suggestion is to start walking for 10 minutes after each meal.  This will help with digestion and help you to metabolize your meal.       For Weight Loss -   Recommend portion sizes with more fruits/vegetables/high fiber foods.  Stay away from white bread/pastas/white rice/white potatoes.  Increase Fluid intake to 6-8 glasses of water daily.   Eliminate soda's (diet and regular) from your fluid intake.      Document Released: 01/20/2012 Document Revised: 03/11/2013 Document Reviewed: 01/20/2012  ExitCare® Patient Information ©2013 [employer].    Fat and Cholesterol Control Diet  Cholesterol is a wax-like substance. It comes from your liver and is found in certain foods. There is good (HDL) and bad (LDL) cholesterol. Too much cholesterol in your blood can affect your heart. Certain foods can lower or raise your cholesterol. Eat foods that are low in cholesterol.  Saturated and trans fats are bad fats found in foods that will raise your cholesterol. Do not eat foods that are high in saturated and trans fats.  FOODS HIGHER IN SATURATED AND TRANS FATS  · Dairy products, such as whole milk, eggs, cheese, cream, and butter.   · Fatty meats, such as hot dogs, sausage, and salami.   · Fried foods.   · Trans  fats which are found in margarine and pre-made cookies, crackers, and baked goods.   · Tropical oils, such as coconut and palm oils.   Read package labels at the store. Do not buy products that use saturated or trans fats or hydrogenated oils. Find foods labeled:  · Low-fat.   · Low-saturated fat.   · Trans-fat-free.   · Low-cholesterol.   FOODS LOWER IN CHOLESTEROL  ·  Fruit.   · Vegetables.   · Beans, peas, and lentils.   · Fish.   · Lean meat, such as chicken (without skin) or ground turkey.   · Grains, such as barley, rice, couscous, bulgur wheat, and pasta.   · Heart-healthy tub margarine.   PREPARING YOUR FOOD  · Broil, bake, steam, or roast foods. Do not sarmiento food.   · Use non-stick cooking sprays.   · Use lemon or herbs to flavor food instead of using butter or stick margarine.   · Use nonfat yogurt, salsa, or low-fat dressings for salads.   Document Released: 06/18/2013 Document Reviewed: 06/18/2013  ExitCare® Patient Information ©2013 [employer].    Recommend annual flu vaccine.  Next due in Fall, 2015.  If you decide not to have the flu vaccine, recommend good handwashing and staying out of crowds during flu season.        Basic Carbohydrate Counting for Diabetes Mellitus  Carbohydrate counting is a method for keeping track of the amount of carbohydrates you eat. Eating carbohydrates naturally increases the level of sugar (glucose) in your blood, so it is important for you to know the amount that is okay for you to have in every meal. Carbohydrate counting helps keep the level of glucose in your blood within normal limits. The amount of carbohydrates allowed is different for every person. A dietitian can help you calculate the amount that is right for you. Once you know the amount of carbohydrates you can have, you can count the carbohydrates in the foods you want to eat.  Carbohydrates are found in the following foods:  · Grains, such as breads and cereals.  · Dried beans and soy products.  · Starchy  "vegetables, such as potatoes, peas, and corn.  · Fruit and fruit juices.  · Milk and yogurt.  · Sweets and snack foods, such as cake, cookies, candy, chips, soft drinks, and fruit drinks.  CARBOHYDRATE COUNTING  There are two ways to count the carbohydrates in your food. You can use either of the methods or a combination of both.  Reading the \"Nutrition Facts\" on Packaged Food  The \"Nutrition Facts\" is an area that is included on the labels of almost all packaged food and beverages in the [place]. It includes the serving size of that food or beverage and information about the nutrients in each serving of the food, including the grams (g) of carbohydrate per serving.   Decide the number of servings of this food or beverage that you will be able to eat or drink. Multiply that number of servings by the number of grams of carbohydrate that is listed on the label for that serving. The total will be the amount of carbohydrates you will be having when you eat or drink this food or beverage.  Learning Standard Serving Sizes of Food  When you eat food that is not packaged or does not include \"Nutrition Facts\" on the label, you need to measure the servings in order to count the amount of carbohydrates. A serving of most carbohydrate-rich foods contains about 15 g of carbohydrates. The following list includes serving sizes of carbohydrate-rich foods that provide 15 g of carbohydrate per serving:    · 1 slice of bread (1 oz) or 1 six-inch tortilla.    · ½ of a hamburger bun or English muffin.  · 4-6 crackers.  · ¾ cup unsweetened dry cereal.    · ½ cup hot cereal.  ·  cup rice or pasta.    · ½ cup mashed potatoes or ¼ of a large baked potato.  · 1 cup fresh fruit or one small piece of fruit.    · ½ cup canned or frozen fruit or fruit juice.  · 1 cup milk.  ·  cup plain fat-free yogurt or yogurt sweetened with artificial sweeteners.  · ½ cup cooked dried beans or starchy vegetable, such as peas, corn, or potatoes. "    Decide the number of standard-size servings that you will eat. Multiply that number of servings by 15 (the grams of carbohydrates in that serving). For example, if you eat 2 cups of strawberries, you will have eaten 2 servings and 30 g of carbohydrates (2 servings x 15 g = 30 g). For foods such as soups and casseroles, in which more than one food is mixed in, you will need to count the carbohydrates in each food that is included.  EXAMPLE OF CARBOHYDRATE COUNTING  Sample Dinner   · 3 oz chicken breast.  ·  cup of brown rice.  · ½ cup of corn.  · 1 cup milk.    · 1 cup strawberries with sugar-free whipped topping.    Carbohydrate Calculation   Step 1: Identify the foods that contain carbohydrates:   · Rice.    · Corn.    · Milk.    · Strawberries.  Step 2: Calculate the number of servings eaten of each:   · 2 servings of rice.    · 1 serving of corn.    · 1 serving of milk.    · 1 serving of strawberries.  Step 3:  Multiply each of those number of servings by 15 g:   · 2 servings of rice x 15 g = 30 g.    · 1 serving of corn x 15 g = 15 g.    · 1 serving of milk x 15 g = 15 g.    · 1 serving of strawberries x 15 g = 15 g.  Step 4: Add together all of the amounts to find the total grams of carbohydrates eaten: 30 g + 15 g + 15 g + 15 g = 75 g.     This information is not intended to replace advice given to you by your health care provider. Make sure you discuss any questions you have with your health care provider.     Document Released: 12/18/2006 Document Revised: 01/08/2016 Document Reviewed: 11/14/2014  [employer] Interactive Patient Education ©2016 [employer]

## 2017-07-18 NOTE — ASSESSMENT & PLAN NOTE
Chronic condition managed with current medical regimen  Stable per review   Continue with current meds  Followed by Magaly Jane M.D..

## 2017-07-18 NOTE — PROGRESS NOTES
CC:   Medicare Annual Wellness Visit    HPI:  Nery is a 67 y.o. here for Medicare Annual Wellness Visit    Patient Active Problem List    Diagnosis Date Noted   • Inflamed seborrheic keratosis 07/14/2017   • AK (actinic keratosis) 07/14/2017   • Preventative health care 04/10/2017   • Postmenopausal 08/08/2016   • Diabetes mellitus type 2, controlled (CMS-[facility]) 08/08/2016   • Osteopenia 08/25/2010   • Vitamin d deficiency 08/12/2010   • Hypercholesteremia 08/12/2010     Current Outpatient Prescriptions   Medication Sig Dispense Refill   • calcium acetate (PHOS-LO) 667 MG Cap Take 1,334 mg by mouth 3 times a day, with meals.     • sitagliptin (JANUVIA) 50 MG Tab Take 1 Tab by mouth every day. 90 Tab 0   • glipiZIDE (GLUCOTROL) 10 MG Tab TAKE 1 TABLET BY MOUTH TWICE A  Tab 0   • metformin (GLUCOPHAGE) 1000 MG tablet TAKE 1 TABLET BY MOUTH TWICE A DAY WITH MEALS *DNFU 08/10 180 Tab 0   • lisinopril (PRINIVIL) 5 MG Tab Take 1 Tab by mouth every day. 90 Tab 0   • simvastatin (ZOCOR) 20 MG Tab Take 1 Tab by mouth every evening. 90 Tab 0   • Misc. Devices MISC by Does not apply route. DIABETIC TEST STRIPS AND LANCETS    SIG:  USE AS DIRECTED 3 Each 11   • SitaGLIPtin-MetFORMIN HCl 100-1000 MG TABLET SR 24 HR Take 1 Application by mouth 2 Times a Day. 180 Tab 0     No current facility-administered medications for this visit.      Current supplements: no  Chronic narcotic pain medicines: no  Allergies: Review of patient's allergies indicates no known allergies.  Exercise: yes  Current social contact/activities: Has support of family and friends      Screening:  Depression Screening    Little interest or pleasure in doing things?  0 - not at all  Feeling down, depressed , or hopeless? 0 - not at all  Trouble falling or staying asleep, or sleeping too much?     Feeling tired or having little energy?     Poor appetite or overeating?     Feeling bad about yourself - or that you are a failure or have let yourself or your  family down?    Trouble concentrating on things, such as reading the newspaper or watching television?    Moving or speaking so slowly that other people could have noticed.  Or the opposite - being so fidgety or restless that you have been moving around a lot more than usual?     Thoughts that you would be better off dead, or of hurting yourself?     Patient Health Questionnaire Score:      If depressive symptoms identified deferred to follow up visit unless specifically addressed in assessment and plan.    Interpretation of PHQ-9 Total Score   Score Severity   1-4 No Depression   5-9 Mild Depression   10-14 Moderate Depression   15-19 Moderately Severe Depression   20-27 Severe Depression      Screening for Cognitive Impairment    Three Minute Recall (apple, watch, karly)  3/3    Draw clock face with all 12 numbers set to the hand to show 10 minutes past 11 o'clock  1 5  Cognitive concerns identified deferred for follow up unless specifically addressed in assessment and plan.    Fall Risk Assessment    Has the patient had two or more falls in the last year or any fall with injury in the last year?  No    Safety Assessment    Throw rugs on floor.  Yes  Handrails on all stairs.  Yes  Good lighting in all hallways.  Yes  Difficulty hearing.  No  Patient counseled about all safety risks that were identified.    Functional Assessment ADLs    Are there any barriers preventing you from cooking for yourself or meeting nutritional needs?  No.    Are there any barriers preventing you from driving safely or obtaining transportation?  No.    Are there any barriers preventing you from using a telephone or calling for help?  No.    Are there any barriers preventing you from shopping?  No.    Are there any barriers preventing you from taking care of your own finances?  No.    Are there any barriers preventing you from managing your medications?  No.    Are currently engaging any exercise or physical activity?  Yes.       Health  Maintenance Summary                Annual Wellness Visit Overdue 1950     RETINAL SCREENING Overdue 5/4/1967     BONE DENSITY Overdue 8/23/2015      Done 8/23/2010 DS-BONE DENSITY STUDY (DEXA)     Patient has more history with this topic...    MAMMOGRAM Overdue 5/17/2016      Done 11/17/2015 MA-CAD DIAGNOSTIC-MAMMO     Patient has more history with this topic...    IMM INFLUENZA Next Due 9/1/2017      Done 10/10/2015 Imm Admin: Influenza TIV (IM)     Patient has more history with this topic...    A1C SCREENING Next Due 12/19/2017      Done 6/19/2017 HEMOGLOBIN A1C (A)     Patient has more history with this topic...    DIABETES MONOFILAMENT / LE EXAM Next Due 4/10/2018      Done 4/10/2017      Patient has more history with this topic...    FASTING LIPID PROFILE Next Due 6/19/2018      Done 6/19/2017 LIPID PANEL     Patient has more history with this topic...    URINE ACR / MICROALBUMIN Next Due 6/19/2018      Done 6/19/2017 MICROALB/CREAT RATIO RAND. UR     Patient has more history with this topic...    SERUM CREATININE Next Due 6/19/2018      Done 6/19/2017 COMP METABOLIC PANEL (A)     Patient has more history with this topic...    COLONOSCOPY Next Due 10/2/2024      Done 10/2/2014 AMB REFERRAL TO GI FOR COLONOSCOPY    IMM DTaP/Tdap/Td Vaccine Next Due 6/23/2026      Done 6/23/2016 Imm Admin: Tdap Vaccine     Patient has more history with this topic...          Patient Care Team:  Magaly Jane M.D. as PCP - General (Family Medicine)        Social History   Substance Use Topics   • Smoking status: Never Smoker    • Smokeless tobacco: Never Used   • Alcohol Use: 0.0 oz/week     0 Standard drinks or equivalent per week      Comment: occasionally       Family History   Problem Relation Age of Onset   • Cancer Mother      melanoma   • No Known Problems Father      She  has a past medical history of Hyperlipidemia and Diabetes. She also has no past medical history of Encounter for long-term (current) use  "of other medications.   Past Surgical History   Procedure Laterality Date   • Laparoscopy     • Tonsillectomy     • Cholecystectomy         ROS:    Ostomy or other tubes or amputations: no  Chronic oxygen use no  Last eye exam 4/2017  : Denies incontinence.   Gait: Uses no assistive device   Hearing excellent.    Dentition good    Exam: Blood pressure 120/68, pulse 74, temperature 35.8 °C (96.4 °F), height 1.575 m (5' 2\"), weight 67.586 kg (149 lb), SpO2 96 %. Body mass index is 27.25 kg/(m^2).  Alert, oriented in no acute distress.  Eye contact is good, speech goal directed, affect calm      Assessment and Plan. The following treatment and monitoring plan is recommended for all problems as listed below:   Vitamin D deficiency  Not currently on supplement  Last D level drawn prior to 2010    Preventative health care  Managed by Magaly Jane M.D.     Postmenopausal  Pt will be calling for appt for both mammo and DEXA, has order and phone number  Asymptomatic re menopausal sxs    Osteopenia  Will be scheduling DEXA   Continue with current meds   Followed by Magaly Jane M.D..     Need for prophylactic vaccination with combined vaccine  received    Inflamed seborrheic keratosis  Per pt had liq nitrogen trearment 7/14/2017  No change at this time    Hypercholesteremia  Chronic condition managed with current medical regimen  Stable per review   Continue with current meds  Followed by Magaly Jane M.D..        Diabetes mellitus type 2, controlled  Chronic condition managed with current medical regimen  6/1/2017   Glycohemoglobin 8.1 (H) 4.8 - 5.6 % Final     Glucose 189 (H) 65 - 99 mg/dL Final    Continue with current meds, diet and exercise  Followed by Maagly Jane M.D..        AK (actinic keratosis)  Chronic condition managed with current medical regimen  Stable per review with recent liq nitrogen application   Continue with surveillance  Followed by Magaly Hayward " MELANIE Jane.        Abnormal mammogram of left breast  Had follow up neg   Historical data        Health Care Screening recommendations reviewed with patient today: per Patient Instructions  DPA/Advanced directive: .has NO advanced directive - not interested in additional information    Next office visit for recheck of chronic medical conditions is due with Magaly Jane M.D.10/17/2017    Per pt has phone number to sched both mammo and DEXA

## 2017-07-18 NOTE — ASSESSMENT & PLAN NOTE
Chronic condition managed with current medical regimen  6/1/2017   Glycohemoglobin 8.1 (H) 4.8 - 5.6 % Final     Glucose 189 (H) 65 - 99 mg/dL Final    Continue with current meds, diet and exercise  Followed by Magaly Jane M.D..

## 2017-07-18 NOTE — ASSESSMENT & PLAN NOTE
Chronic condition managed with current medical regimen  Stable per review with recent liq nitrogen application   Continue with surveillance  Followed by Magaly Jane M.D..

## 2017-08-15 DIAGNOSIS — E78.5 HYPERLIPIDEMIA: ICD-10-CM

## 2017-08-17 RX ORDER — SIMVASTATIN 20 MG
20 TABLET ORAL EVERY EVENING
Qty: 90 TAB | Refills: 0 | Status: SHIPPED | OUTPATIENT
Start: 2017-08-17 | End: 2018-03-09 | Stop reason: SDUPTHER

## 2017-08-22 ENCOUNTER — TELEPHONE (OUTPATIENT)
Dept: MEDICAL GROUP | Age: 67
End: 2017-08-22

## 2017-08-22 DIAGNOSIS — E11.21 CONTROLLED TYPE 2 DIABETES MELLITUS WITH DIABETIC NEPHROPATHY, WITHOUT LONG-TERM CURRENT USE OF INSULIN (HCC): ICD-10-CM

## 2017-08-22 NOTE — TELEPHONE ENCOUNTER
1. Caller Name: Nery Brady                                         Call Back Number: [phone] (home)       Patient approves a detailed voicemail message: N\A    Patient needs rx for a new diabetic glucometer. Unable to send as refill request.

## 2017-08-25 ENCOUNTER — TELEPHONE (OUTPATIENT)
Dept: MEDICAL GROUP | Age: 67
End: 2017-08-25

## 2017-08-25 DIAGNOSIS — E11.21 CONTROLLED TYPE 2 DIABETES MELLITUS WITH DIABETIC NEPHROPATHY, WITHOUT LONG-TERM CURRENT USE OF INSULIN (HCC): ICD-10-CM

## 2017-08-25 NOTE — TELEPHONE ENCOUNTER
[id number] - [place], [place] - [address]  [address]  [place] [address]  Phone: [phone] Fax: [phone]      Pharmacy called to let us know they don't see a Dx on Rx for glucose monitoring device.

## 2017-08-31 NOTE — TELEPHONE ENCOUNTER
Magaly Jane M.D.   Joanna Matamoros, Med Ass't 6 days ago      It is there and it is controlled diabetes mellitus type 2. (Routing comment)

## 2017-09-11 DIAGNOSIS — E11.21 CONTROLLED TYPE 2 DIABETES MELLITUS WITH DIABETIC NEPHROPATHY, WITHOUT LONG-TERM CURRENT USE OF INSULIN (HCC): ICD-10-CM

## 2017-09-14 DIAGNOSIS — E11.21 CONTROLLED TYPE 2 DIABETES MELLITUS WITH DIABETIC NEPHROPATHY, WITHOUT LONG-TERM CURRENT USE OF INSULIN (HCC): ICD-10-CM

## 2017-09-14 RX ORDER — BLOOD-GLUCOSE METER
KIT MISCELLANEOUS
Qty: 1 KIT | Refills: 0 | Status: SHIPPED
Start: 2017-09-14 | End: 2017-10-03 | Stop reason: SDUPTHER

## 2017-09-14 RX ORDER — BLOOD-GLUCOSE METER
KIT MISCELLANEOUS
Qty: 1 KIT | Refills: 0 | Status: SHIPPED | OUTPATIENT
Start: 2017-09-14 | End: 2017-09-14 | Stop reason: SDUPTHER

## 2017-09-14 NOTE — TELEPHONE ENCOUNTER
Was the patient seen in the last year in this department? Yes     Does patient have an active prescription for medications requested? No     Received Request Via: Pharmacy     Pharmacy needs Dx changed  is not a valid code

## 2017-09-29 RX ORDER — LANCETS 30 GAUGE
EACH MISCELLANEOUS
Qty: 100 EACH | Refills: 3 | Status: SHIPPED | OUTPATIENT
Start: 2017-09-29 | End: 2021-09-24 | Stop reason: SDUPTHER

## 2017-10-03 DIAGNOSIS — E11.21 CONTROLLED TYPE 2 DIABETES MELLITUS WITH DIABETIC NEPHROPATHY, WITHOUT LONG-TERM CURRENT USE OF INSULIN (HCC): ICD-10-CM

## 2017-10-03 RX ORDER — BLOOD-GLUCOSE METER
KIT MISCELLANEOUS
Qty: 1 KIT | Refills: 0 | Status: SHIPPED
Start: 2017-10-03

## 2017-10-05 DIAGNOSIS — E11.21 DIABETIC NEPHROPATHY ASSOCIATED WITH TYPE 2 DIABETES MELLITUS (HCC): ICD-10-CM

## 2017-10-05 RX ORDER — LANCETS 30 GAUGE
EACH MISCELLANEOUS
Qty: 100 EACH | Refills: 3 | Status: SHIPPED
Start: 2017-10-05 | End: 2021-08-13

## 2017-10-06 DIAGNOSIS — E11.21 DIABETIC NEPHROPATHY ASSOCIATED WITH TYPE 2 DIABETES MELLITUS (HCC): ICD-10-CM

## 2017-10-10 DIAGNOSIS — E11.21 DIABETIC NEPHROPATHY ASSOCIATED WITH TYPE 2 DIABETES MELLITUS (HCC): ICD-10-CM

## 2017-10-17 ENCOUNTER — OFFICE VISIT (OUTPATIENT)
Dept: MEDICAL GROUP | Age: 67
End: 2017-10-17
Payer: MEDICARE

## 2017-10-17 VITALS
OXYGEN SATURATION: 96 % | DIASTOLIC BLOOD PRESSURE: 80 MMHG | WEIGHT: 146.2 LBS | SYSTOLIC BLOOD PRESSURE: 142 MMHG | TEMPERATURE: 98.4 F | HEIGHT: 62 IN | HEART RATE: 85 BPM | BODY MASS INDEX: 26.91 KG/M2

## 2017-10-17 DIAGNOSIS — E78.00 HYPERCHOLESTEREMIA: ICD-10-CM

## 2017-10-17 DIAGNOSIS — L82.0 INFLAMED SEBORRHEIC KERATOSIS: ICD-10-CM

## 2017-10-17 DIAGNOSIS — Z78.0 POSTMENOPAUSAL: ICD-10-CM

## 2017-10-17 DIAGNOSIS — Z12.31 VISIT FOR SCREENING MAMMOGRAM: ICD-10-CM

## 2017-10-17 DIAGNOSIS — Z23 NEED FOR VACCINATION: ICD-10-CM

## 2017-10-17 DIAGNOSIS — Z20.828 EXPOSURE TO MONONUCLEOSIS SYNDROME: ICD-10-CM

## 2017-10-17 DIAGNOSIS — L57.0 AK (ACTINIC KERATOSIS): ICD-10-CM

## 2017-10-17 DIAGNOSIS — E11.21 CONTROLLED TYPE 2 DIABETES MELLITUS WITH DIABETIC NEPHROPATHY, WITHOUT LONG-TERM CURRENT USE OF INSULIN (HCC): Primary | Chronic | ICD-10-CM

## 2017-10-17 PROCEDURE — 90662 IIV NO PRSV INCREASED AG IM: CPT | Performed by: FAMILY MEDICINE

## 2017-10-17 PROCEDURE — G0008 ADMIN INFLUENZA VIRUS VAC: HCPCS | Performed by: FAMILY MEDICINE

## 2017-10-17 PROCEDURE — 17000 DESTRUCT PREMALG LESION: CPT | Mod: 59 | Performed by: FAMILY MEDICINE

## 2017-10-17 PROCEDURE — 99214 OFFICE O/P EST MOD 30 MIN: CPT | Mod: 25 | Performed by: FAMILY MEDICINE

## 2017-10-17 PROCEDURE — 17110 DESTRUCTION B9 LES UP TO 14: CPT | Performed by: FAMILY MEDICINE

## 2017-10-17 PROCEDURE — 17003 DESTRUCT PREMALG LES 2-14: CPT | Performed by: FAMILY MEDICINE

## 2017-10-17 NOTE — ASSESSMENT & PLAN NOTE
The patient has been taking simvastatin 20 mg by mouth daily. She denies any muscle aches or lower thoracic levels.    Results for CARLTON SANTOS (MRN [id number]) as of 10/17/2017 13:39   Ref. Range 6/19/2017 11:11   Cholesterol,Tot Latest Ref Range: 100 - 199 mg/dL 134   Triglycerides Latest Ref Range: 0 - 149 mg/dL 97   HDL Latest Ref Range: >39 mg/dL 49   LDL Latest Ref Range: 0 - 99 mg/dL 66   VLDL Cholesterol Calc Latest Ref Range: 5 - 40 mg/dL 19   Comment: Unknown CANCELED

## 2017-10-17 NOTE — PROGRESS NOTES
This medical record contains text that has been entered with the assistance of computer voice recognition and dictation software.  Therefore, it may contain unintended errors in text, spelling, punctuation, or grammar    Chief Complaint   Patient presents with   • Other     see reason for visit       Nery Santos is a 67 y.o. female here evaluation and management of: routine follow up, mono exposure, hld, isk ,ak      HPI:     Inflamed seborrheic keratosis      Patient states that the previous session of cryotherapy worked very well, patient  has several more irritating lesions that they would like to have destroyed.      Postmenopausal  Due for DEXA    Exposure to mononucleosis syndrome  The patient states her daughter was diagnosed with mononucleosis. She has been complaining of generalized fatigue, no energy and muscle aches for the past month or so. She states she started feeling better about a week ago. She would like to be tested for mononucleosis. She denies any fevers or chills no abdominal pain, no easy bruisability.    Hypercholesteremia  The patient has been taking simvastatin 20 mg by mouth daily. She denies any muscle aches or lower thoracic levels.    Results for NERY SANTOS (MRN [id number]) as of 10/17/2017 13:39   Ref. Range 6/19/2017 11:11   Cholesterol,Tot Latest Ref Range: 100 - 199 mg/dL 134   Triglycerides Latest Ref Range: 0 - 149 mg/dL 97   HDL Latest Ref Range: >39 mg/dL 49   LDL Latest Ref Range: 0 - 99 mg/dL 66   VLDL Cholesterol Calc Latest Ref Range: 5 - 40 mg/dL 19   Comment: Unknown CANCELED     Current medicines (including changes today)  Current Outpatient Prescriptions   Medication Sig Dispense Refill   • Blood Glucose Monitoring Suppl Device Meter: Dispense Abbott [place] Lite meter. Sig use BID and prn symptoms of low sugar . 1 Device 2   • Blood Glucose Monitoring Suppl (FREESTYLE FREEDOM LITE) w/Device Kit USE AS DIRECTED FOR BLOOD SUGAR MONITORING. 1 Kit 0   •  Lancets Misc Lancets order: Lancets for Abbott [place] Lite meter. Sig: use bid and prn ssx high or low sugar. 100 Each 3   • simvastatin (ZOCOR) 20 MG Tab Take 1 Tab by mouth every evening. 90 Tab 0   • calcium acetate (PHOS-LO) 667 MG Cap Take 1,334 mg by mouth 3 times a day, with meals.     • sitagliptin (JANUVIA) 50 MG Tab Take 1 Tab by mouth every day. 90 Tab 0   • glipiZIDE (GLUCOTROL) 10 MG Tab TAKE 1 TABLET BY MOUTH TWICE A  Tab 0   • metformin (GLUCOPHAGE) 1000 MG tablet TAKE 1 TABLET BY MOUTH TWICE A DAY WITH MEALS *DNFU 08/10 180 Tab 0   • lisinopril (PRINIVIL) 5 MG Tab Take 1 Tab by mouth every day. 90 Tab 0   • Blood Glucose Monitoring Suppl Supplies Misc Test strips order: Test strips for Abbott [place] Lite meter. Sig: use bid  and prn ssx high or low sugar 100 Each 0   • Blood Glucose Monitoring Suppl Supplies Misc Test strips order: Test strips for Abbott [place] Lite meter. Sig: use bid  and prn ssx high or low sugar 100 Each 0   • Lancets Misc Lancets order: Lancets for [employer] [place] Lite meter. Sig: use bid  and prn ssx high or low sugar. 100 Each 3   • SitaGLIPtin-MetFORMIN HCl 100-1000 MG TABLET SR 24 HR Take 1 Application by mouth 2 Times a Day. 180 Tab 0   • Misc. Devices MISC by Does not apply route. DIABETIC TEST STRIPS AND LANCETS    SIG:  USE AS DIRECTED 3 Each 11     No current facility-administered medications for this visit.      She  has a past medical history of Diabetes and Hyperlipidemia. She also has no past medical history of Encounter for long-term (current) use of other medications.  She  has a past surgical history that includes laparoscopy; tonsillectomy; and cholecystectomy.  Social History   Substance Use Topics   • Smoking status: Never Smoker   • Smokeless tobacco: Never Used   • Alcohol use 0.0 oz/week      Comment: occasionally     Social History     Social History Narrative   • No narrative on file     Family History   Problem Relation Age of Onset   • Cancer  "Mother      melanoma   • No Known Problems Father      Family Status   Relation Status   • Mother  at age 62   • Father  at age 85   • Sister Alive   • Brother Alive         ROS    Please see hpi     All other systems reviewed and are negative     Objective:     Blood pressure 142/80, pulse 85, temperature 36.9 °C (98.4 °F), height 1.575 m (5' 2.01\"), weight 66.3 kg (146 lb 3.2 oz), SpO2 96 %. Body mass index is 26.73 kg/m².  Physical Exam:    Constitutional: Alert, no distress.  Skin: Warm, dry, good turgor, no rashes in visible areas.  Eye: Equal, round and reactive, conjunctiva clear, lids normal.  ENMT: Lips without lesions, good dentition, oropharynx clear.  Neck: Trachea midline, no masses, no thyromegaly. No cervical or supraclavicular lymphadenopathy.  Respiratory: Unlabored respiratory effort, lungs clear to auscultation, no wheezes, no ronchi.  Cardiovascular: Normal S1, S2, no murmur, no edema.  Abdomen: Soft, non-tender, no masses, no hepatosplenomegaly.  Psych: Alert and oriented x3, normal affect and mood.    SKIN EXAM  Several well-demarcated, round/oval lesions with a dull, verrucous surface and irregular stuck-on appearance on back and chest      multiple lesions on bilateral forearms, hands, and chest, with evidence of of solar damage present , spotty hyperpigmentation, scattered telangiectasias, and  Xerosis      PROCEDURE: CRYOTHERAPY  Discussed risks and benefits of cryotherapy including but not limited to scarring, hyperpigmentation, hypopigmentation, hypertrophic scarring, keiloid scarring, incomplete or no resolution of lesions treated,pain, undesirable cosemetic result, blistering, potential need for additional treatment including more invasive treatment. Patient expresses understanding and verbally acknowledges risks and consent to treatment. 2  applications of cryotherapy with 3 second freeze thaw cycle was applied to  11 AK's and  all SK's. Patient tolerated procedure " well. There were no complications. Aftercare instructions given.            Assessment and Plan:   The following treatment plan was discussed      1. Exposure to mononucleosis syndrome  i explained that this will no change rx  Which is only supportive  And to avoid contact sports    - HETEROPHILE AB SCREEN    2. AK (actinic keratosis)  Patient tollerrated procedure well  There were no adverse events  Patient was given post procedure precautions       3. Inflamed seborrheic keratosis    Patient tollerrated procedure well  There were no adverse events  Patient was given post procedure precautions           4. Need for vaccination   given today    - INFLUENZA VACCINE, HIGH DOSE (65+ ONLY)    5. Controlled type 2 diabetes mellitus with diabetic nephropathy, without long-term current use of insulin (CMS-[facility])    januvia not covered by her insurrance  Not at goal  i recommended repeat visit with  DM RN    The patient was counseled on the following  1. meticulous bilateral feet inspection daily  2. yearly dilated eye exams,   3. Wt. loss of 5% will decrease insulin resistance follow a low-fat diet and to begin an exercise program  4.  Pt. is on baby aspirin  5. Patient  is on an ace inhibitor will check microalbumin yearly  6. BP goal is ,130/80, and LDL goal is <70  7. Hba1c goal is less than 7  8. Recommend cmp and A1C evaluation every 3 months  9. Check blood sugar twice daily  and minimum once daily at various times  10. Also needs diabetic education  11. Recommend vaccinations: Yearly Flu, Pneumvox, and hepatitis B vaccine  12. Yearly dental exam  13. Patient is on a Statin    - POCT Retinal Eye Exam    6. Visit for screening mammogram  Ordered    - MA-SCREEN MAMMO W/CAD-BILAT    7. Postmenopausal  DEXA Due    - DS-BONE DENSITY STUDY (DEXA); Future    8. Hypercholesteremia  Repeat labs        HEALTH MAINTENANCE:    Instructed to Follow up in clinic or ER for worsening symptoms, difficulty breathing, lack of expected  recovery, or should new symptoms or problems arise.    Followup: Return in about 3 months (around 1/17/2018) for Reevaluation.       Once again this medical record contains text that has been entered with the assistance of computer voice recognition and dictation software.  Therefore, it may contain unintended errors in text, spelling, punctuation, or grammar

## 2017-10-17 NOTE — ASSESSMENT & PLAN NOTE
The patient states her daughter was diagnosed with mononucleosis. She has been complaining of generalized fatigue, no energy and muscle aches for the past month or so. She states she started feeling better about a week ago. She would like to be tested for mononucleosis. She denies any fevers or chills no abdominal pain, no easy bruisability.

## 2017-10-17 NOTE — ASSESSMENT & PLAN NOTE
Patient states that the previous session of cryotherapy worked very well, patient  has several more irritating lesions that they would like to have destroyed.

## 2017-10-18 LAB — HETEROPH AB SER QL LA: NEGATIVE

## 2018-03-06 RX ORDER — GLIPIZIDE 10 MG/1
TABLET ORAL
Qty: 180 TAB | Refills: 0 | Status: SHIPPED | OUTPATIENT
Start: 2018-03-06 | End: 2018-12-13 | Stop reason: SDUPTHER

## 2018-04-17 ENCOUNTER — OFFICE VISIT (OUTPATIENT)
Dept: MEDICAL GROUP | Age: 68
End: 2018-04-17
Payer: MEDICARE

## 2018-04-17 VITALS
SYSTOLIC BLOOD PRESSURE: 134 MMHG | WEIGHT: 156 LBS | DIASTOLIC BLOOD PRESSURE: 68 MMHG | HEIGHT: 62 IN | BODY MASS INDEX: 28.71 KG/M2 | TEMPERATURE: 98.8 F | OXYGEN SATURATION: 94 % | HEART RATE: 92 BPM

## 2018-04-17 DIAGNOSIS — L57.0 AK (ACTINIC KERATOSIS): ICD-10-CM

## 2018-04-17 DIAGNOSIS — E78.00 HYPERCHOLESTEREMIA: ICD-10-CM

## 2018-04-17 DIAGNOSIS — Z78.0 POSTMENOPAUSAL: ICD-10-CM

## 2018-04-17 DIAGNOSIS — E11.9 CONTROLLED TYPE 2 DIABETES MELLITUS WITHOUT COMPLICATION, UNSPECIFIED LONG TERM INSULIN USE STATUS: ICD-10-CM

## 2018-04-17 DIAGNOSIS — L82.0 INFLAMED SEBORRHEIC KERATOSIS: ICD-10-CM

## 2018-04-17 DIAGNOSIS — Z12.31 VISIT FOR SCREENING MAMMOGRAM: ICD-10-CM

## 2018-04-17 LAB
HBA1C MFR BLD: 8.2 % (ref ?–5.8)
INT CON NEG: NEGATIVE
INT CON POS: POSITIVE

## 2018-04-17 PROCEDURE — 17000 DESTRUCT PREMALG LESION: CPT | Mod: 59 | Performed by: FAMILY MEDICINE

## 2018-04-17 PROCEDURE — 83036 HEMOGLOBIN GLYCOSYLATED A1C: CPT | Performed by: FAMILY MEDICINE

## 2018-04-17 PROCEDURE — 99214 OFFICE O/P EST MOD 30 MIN: CPT | Mod: 25 | Performed by: FAMILY MEDICINE

## 2018-04-17 PROCEDURE — 17003 DESTRUCT PREMALG LES 2-14: CPT | Performed by: FAMILY MEDICINE

## 2018-04-17 PROCEDURE — 17110 DESTRUCTION B9 LES UP TO 14: CPT | Performed by: FAMILY MEDICINE

## 2018-04-17 NOTE — ASSESSMENT & PLAN NOTE
The patient is 18 years postmenopausal and is due for repeat bone density. She denies any recent fractures.

## 2018-04-17 NOTE — ASSESSMENT & PLAN NOTE
.Patient states that the previous session of cryotherapy worked very well, patient  has several more irritating lesions that they would like to have destroyed.

## 2018-04-17 NOTE — ASSESSMENT & PLAN NOTE
Simvastatin 20 mg by mouth daily at bedtime    The patient has been on a statin for years and tolerating this fine. The patient denies any muscle aches, no abdominal pain and no history of elevated liver enzymes.

## 2018-04-17 NOTE — PROGRESS NOTES
This medical record contains text that has been entered with the assistance of computer voice recognition and dictation software.  Therefore, it may contain unintended errors in text, spelling, punctuation, or grammar    Chief Complaint   Patient presents with   • Follow-Up     6 mo       Nery Brady is a 67 y.o. female here evaluation and management of: DM, ISK, postmenopausal, HLD      HPI:     Diabetes mellitus type 2, controlled  Metformin 1000 mg by mouth twice a day  Glipizide 10 mg by mouth twice a day  Last hemoglobin A1c today in clinic 8.2  She states that she has not been riding her bike as much.  The patient denies  any polydipsia, polyuria, polyphagia, weight loss, no numbness or tingling anywhere, no changes in vision, no ulcerations or poor healing wounds.    Inflamed seborrheic keratosis  .Patient states that the previous session of cryotherapy worked very well, patient  has several more irritating lesions that they would like to have destroyed.    Postmenopausal  The patient is 18 years postmenopausal and is due for repeat bone density. She denies any recent fractures.    Hypercholesteremia  Simvastatin 20 mg by mouth daily at bedtime    The patient has been on a statin for years and tolerating this fine. The patient denies any muscle aches, no abdominal pain and no history of elevated liver enzymes.    Current medicines (including changes today)  Current Outpatient Prescriptions   Medication Sig Dispense Refill   • SITagliptin (JANUVIA) 50 MG Tab Take 1 Tab by mouth every day. 90 Tab 0   • metformin (GLUCOPHAGE) 1000 MG tablet TAKE ONE TABLET BY MOUTH TWICE DAILY WITH MEALS 180 Tab 0   • simvastatin (ZOCOR) 20 MG Tab TAKE ONE TABLET BY MOUTH ONCE DAILY IN THE EVENING 90 Tab 0   • glipiZIDE (GLUCOTROL) 10 MG Tab TAKE ONE TABLET BY MOUTH TWICE DAILY 180 Tab 0   • Blood Glucose Monitoring Suppl Supplies Misc Test strips order: Test strips for Abbott [place] Lite meter. Sig: use bid  and prn  ssx high or low sugar 100 Each 0   • Blood Glucose Monitoring Suppl Supplies Misc Test strips order: Test strips for Abbott [place] Lite meter. Sig: use bid  and prn ssx high or low sugar 100 Each 0   • Blood Glucose Monitoring Suppl Device Meter: Dispense Abbott [place] Lite meter. Sig use BID and prn symptoms of low sugar . 1 Device 2   • Lancets Misc Lancets order: Lancets for [employer] [place] Lite meter. Sig: use bid  and prn ssx high or low sugar. 100 Each 3   • Blood Glucose Monitoring Suppl (FREESTYLE FREEDOM LITE) w/Device Kit USE AS DIRECTED FOR BLOOD SUGAR MONITORING. 1 Kit 0   • Lancets Misc Lancets order: Lancets for [employer] [place] Lite meter. Sig: use bid and prn ssx high or low sugar. 100 Each 3   • metformin (GLUCOPHAGE) 1000 MG tablet TAKE 1 TABLET BY MOUTH TWICE A DAY WITH MEALS *DNFU 08/10 180 Tab 0   • lisinopril (PRINIVIL) 5 MG Tab Take 1 Tab by mouth every day. 90 Tab 0   • Misc. Devices MISC by Does not apply route. DIABETIC TEST STRIPS AND LANCETS    SIG:  USE AS DIRECTED 3 Each 11   • calcium acetate (PHOS-LO) 667 MG Cap Take 1,334 mg by mouth 3 times a day, with meals.     • sitagliptin (JANUVIA) 50 MG Tab Take 1 Tab by mouth every day. (Patient not taking: Reported on 4/17/2018) 90 Tab 0   • SitaGLIPtin-MetFORMIN HCl 100-1000 MG TABLET SR 24 HR Take 1 Application by mouth 2 Times a Day. (Patient not taking: Reported on 4/17/2018) 180 Tab 0     No current facility-administered medications for this visit.      She  has a past medical history of Diabetes and Hyperlipidemia. She also has no past medical history of Encounter for long-term (current) use of other medications.  She  has a past surgical history that includes laparoscopy; tonsillectomy; and cholecystectomy.  Social History   Substance Use Topics   • Smoking status: Never Smoker   • Smokeless tobacco: Never Used   • Alcohol use 0.0 oz/week      Comment: occasionally     Social History     Social History Narrative   • No narrative on  "file     Family History   Problem Relation Age of Onset   • Cancer Mother      melanoma   • No Known Problems Father      Family Status   Relation Status   • Mother  at age 62   • Father  at age 85   • Sister Alive   • Brother Alive         ROS    Please see hpi     All other systems reviewed and are negative     Objective:     Blood pressure 134/68, pulse 92, temperature 37.1 °C (98.8 °F), height 1.575 m (5' 2\"), weight 70.8 kg (156 lb), SpO2 94 %, not currently breastfeeding. Body mass index is 28.53 kg/m².  Physical Exam:    Constitutional: Alert, no distress.  Skin: Warm, dry, good turgor, no rashes in visible areas.  Eye: Equal, round and reactive, conjunctiva clear, lids normal.  ENMT: Lips without lesions, good dentition, oropharynx clear.  Neck: Trachea midline, no masses, no thyromegaly. No cervical or supraclavicular lymphadenopathy.  Respiratory: Unlabored respiratory effort, lungs clear to auscultation, no wheezes, no ronchi.  Cardiovascular: Normal S1, S2, no murmur, no edema.  Abdomen: Soft, non-tender, no masses, no hepatosplenomegaly.  Psych: Alert and oriented x3, normal affect and mood.      SKIN EXAM  Several irregular, pigmented, verrucous surface plaques with dried hemmorhage ,  on back and chest,       multiple lesions on bilateral forearms, hands, and chest, with evidence of of solar damage present , spotty hyperpigmentation, scattered telangiectasias, and  Xerosis      PROCEDURE: CRYOTHERAPY  Discussed risks and benefits of cryotherapy including but not limited to scarring, hyperpigmentation, hypopigmentation, hypertrophic scarring, keiloid scarring, incomplete or no resolution of lesions treated,pain, undesirable cosemetic result, blistering, potential need for additional treatment including more invasive treatment. Patient expresses understanding and verbally acknowledges risks and consent to treatment. 2  applications of cryotherapy with 3 second freeze thaw cycle was " applied to  6AK's and  all irritated and inflamed Seborrheic Keratoses. Patient tolerated procedure well. There were no complications. Aftercare instructions given.            Assessment and Plan:   The following treatment plan was discussed      1. Controlled type 2 diabetes mellitus without complication, unspecified long term insulin use status (CMS-HCC)    Not at goal of less than 8  Start Januvia 50mg  Continue metmorfin 1000 mg po bid  Glipizide 10mg poi bid    - POCT Hemoglobin A1C  - SITagliptin (JANUVIA) 50 MG Tab; Take 1 Tab by mouth every day.  Dispense: 90 Tab; Refill: 0    2. AK (actinic keratosis)  Patient tollerrated procedure well  There were no adverse events  Patient was given post procedure precautions       3. Inflamed seborrheic keratosis  Patient tollerrated procedure well  There were no adverse events  Patient was given post procedure precautions       4. Hypercholesteremia  Patient has been stable with current management  We will make no changes for now      5. Postmenopausal  Due for DEXA    6. Visit for screening mammogram  Due   - MA-SCREEN MAMMO W/CAD-BILAT; Future        HEALTH MAINTENANCE:    Instructed to Follow up in clinic or ER for worsening symptoms, difficulty breathing, lack of expected recovery, or should new symptoms or problems arise.    Followup: Return in about 2 months (around 6/17/2018) for Reevaluation.       Once again this medical record contains text that has been entered with the assistance of computer voice recognition and dictation software.  Therefore, it may contain unintended errors in text, spelling, punctuation, or grammar

## 2018-04-17 NOTE — ASSESSMENT & PLAN NOTE
Metformin 1000 mg by mouth twice a day  Glipizide 10 mg by mouth twice a day  Last hemoglobin A1c today in clinic 8.2  She states that she has not been riding her bike as much.  The patient denies  any polydipsia, polyuria, polyphagia, weight loss, no numbness or tingling anywhere, no changes in vision, no ulcerations or poor healing wounds.

## 2018-04-18 ENCOUNTER — TELEPHONE (OUTPATIENT)
Dept: MEDICAL GROUP | Age: 68
End: 2018-04-18

## 2018-04-18 NOTE — TELEPHONE ENCOUNTER
2. SPECIFIC Action To Be Taken: Orders pending, please sign.    3. Diagnosis/Clinical Reason for Request: mammo    4. Specialty & Provider Name/Lab/Imaging Location: imaging    5. Is appointment scheduled for requested order/referral: no    Patient informed they will receive a return phone call from the office ONLY if there are any questions before processing their request. Advised to call back if they haven't received a call from the referral department in 5 days.

## 2018-04-20 LAB
ALBUMIN SERPL-MCNC: 4.2 G/DL (ref 3.6–4.8)
ALBUMIN/GLOB SERPL: 1.8 {RATIO} (ref 1.2–2.2)
ALP SERPL-CCNC: 75 IU/L (ref 39–117)
ALT SERPL-CCNC: 20 IU/L (ref 0–32)
AST SERPL-CCNC: 15 IU/L (ref 0–40)
BILIRUB SERPL-MCNC: 0.8 MG/DL (ref 0–1.2)
BUN SERPL-MCNC: 10 MG/DL (ref 8–27)
BUN/CREAT SERPL: 14 (ref 12–28)
CALCIUM SERPL-MCNC: 9.1 MG/DL (ref 8.7–10.3)
CHLORIDE SERPL-SCNC: 96 MMOL/L (ref 96–106)
CHOLEST SERPL-MCNC: 151 MG/DL (ref 100–199)
CO2 SERPL-SCNC: 24 MMOL/L (ref 18–29)
CREAT SERPL-MCNC: 0.69 MG/DL (ref 0.57–1)
ERYTHROCYTE [DISTWIDTH] IN BLOOD BY AUTOMATED COUNT: 14.3 % (ref 12.3–15.4)
GFR SERPLBLD CREATININE-BSD FMLA CKD-EPI: 103 ML/MIN/1.73
GFR SERPLBLD CREATININE-BSD FMLA CKD-EPI: 90 ML/MIN/1.73
GLOBULIN SER CALC-MCNC: 2.4 G/DL (ref 1.5–4.5)
GLUCOSE SERPL-MCNC: 239 MG/DL (ref 65–99)
HCT VFR BLD AUTO: 42.4 % (ref 34–46.6)
HDLC SERPL-MCNC: 43 MG/DL
HGB BLD-MCNC: 14.2 G/DL (ref 11.1–15.9)
LABORATORY COMMENT REPORT: NORMAL
LDLC SERPL CALC-MCNC: 79 MG/DL (ref 0–99)
MCH RBC QN AUTO: 29.3 PG (ref 26.6–33)
MCHC RBC AUTO-ENTMCNC: 33.5 G/DL (ref 31.5–35.7)
MCV RBC AUTO: 87 FL (ref 79–97)
NRBC BLD AUTO-RTO: NORMAL %
PLATELET # BLD AUTO: 260 X10E3/UL (ref 150–379)
POTASSIUM SERPL-SCNC: 4.9 MMOL/L (ref 3.5–5.2)
PROT SERPL-MCNC: 6.6 G/DL (ref 6–8.5)
RBC # BLD AUTO: 4.85 X10E6/UL (ref 3.77–5.28)
SODIUM SERPL-SCNC: 136 MMOL/L (ref 134–144)
TRIGL SERPL-MCNC: 147 MG/DL (ref 0–149)
VLDLC SERPL CALC-MCNC: 29 MG/DL (ref 5–40)
WBC # BLD AUTO: 8 X10E3/UL (ref 3.4–10.8)

## 2018-06-29 DIAGNOSIS — E11.8 CONTROLLED DIABETES MELLITUS TYPE 2 WITH COMPLICATIONS (HCC): ICD-10-CM

## 2018-06-29 DIAGNOSIS — E78.5 HYPERLIPIDEMIA: ICD-10-CM

## 2018-07-02 RX ORDER — SIMVASTATIN 20 MG
TABLET ORAL
Qty: 90 TAB | Refills: 0 | Status: SHIPPED | OUTPATIENT
Start: 2018-07-02 | End: 2019-04-26 | Stop reason: SDUPTHER

## 2018-07-02 RX ORDER — LISINOPRIL 5 MG/1
TABLET ORAL
Qty: 90 TAB | Refills: 0 | Status: SHIPPED | OUTPATIENT
Start: 2018-07-02 | End: 2018-11-02 | Stop reason: SDUPTHER

## 2018-07-16 ENCOUNTER — TELEPHONE (OUTPATIENT)
Dept: MEDICAL GROUP | Age: 68
End: 2018-07-16

## 2018-07-16 DIAGNOSIS — E78.5 HYPERLIPIDEMIA: ICD-10-CM

## 2018-07-16 NOTE — TELEPHONE ENCOUNTER
Future Appointments       Provider Department Center    7/17/2018 11:00 AM Mainor Mckenzie M.D. [facility] [address]        ESTABLISHED PATIENT PRE-VISIT PLANNING     Note: Patient will not be contacted if there is no indication to call.     1.  Reviewed notes from the last few office visits within the medical group: Yes    2.  If any orders were placed at last visit or intended to be done for this visit (i.e. 6 mos follow-up), do we have Results/Consult Notes?        •  Labs - Labs ordered, completed on 4/24/18 and results are in chart.   Note: If patient appointment is for lab review and patient did not complete labs, check with provider if OK to reschedule patient until labs completed.       •  Imaging - Not done       •  Referrals - No referrals were ordered at last office visit.    3. Is this appointment scheduled as a Hospital Follow-Up? No    4.  Immunizations were updated in Epic using WebIZ?: Epic matches WebIZ       •  Web Iz Recommendations: Patient is up to date on all vaccines    5.  Patient is due for the following Health Maintenance Topics:   Health Maintenance Due   Topic Date Due   • RETINAL SCREENING  05/04/1967   • BONE DENSITY  08/23/2015   • MAMMOGRAM  05/17/2016   • URINE ACR / MICROALBUMIN  06/19/2018           6.  MDX printed for Provider? NO    7.  Patient was NOT informed to arrive 15 min prior to their scheduled appointment and bring in their medication bottles.

## 2018-07-17 ENCOUNTER — APPOINTMENT (OUTPATIENT)
Dept: MEDICAL GROUP | Age: 68
End: 2018-07-17
Payer: MEDICARE

## 2018-07-18 RX ORDER — SIMVASTATIN 20 MG
TABLET ORAL
Qty: 90 TAB | Refills: 0 | Status: SHIPPED | OUTPATIENT
Start: 2018-07-18 | End: 2020-07-09 | Stop reason: SDUPTHER

## 2018-07-30 ENCOUNTER — HOSPITAL ENCOUNTER (OUTPATIENT)
Dept: LAB | Facility: MEDICAL CENTER | Age: 68
End: 2018-07-30
Attending: FAMILY MEDICINE
Payer: MEDICARE

## 2018-07-30 DIAGNOSIS — E11.21 CONTROLLED TYPE 2 DIABETES MELLITUS WITH DIABETIC NEPHROPATHY, WITHOUT LONG-TERM CURRENT USE OF INSULIN (HCC): ICD-10-CM

## 2018-07-30 LAB
ALBUMIN SERPL BCP-MCNC: 4.3 G/DL (ref 3.2–4.9)
ALBUMIN/GLOB SERPL: 2 G/DL
ALP SERPL-CCNC: 57 U/L (ref 30–99)
ALT SERPL-CCNC: 16 U/L (ref 2–50)
ANION GAP SERPL CALC-SCNC: 9 MMOL/L (ref 0–11.9)
AST SERPL-CCNC: 13 U/L (ref 12–45)
BILIRUB SERPL-MCNC: 1 MG/DL (ref 0.1–1.5)
BUN SERPL-MCNC: 15 MG/DL (ref 8–22)
CALCIUM SERPL-MCNC: 9.2 MG/DL (ref 8.5–10.5)
CHLORIDE SERPL-SCNC: 102 MMOL/L (ref 96–112)
CHOLEST SERPL-MCNC: 139 MG/DL (ref 100–199)
CO2 SERPL-SCNC: 27 MMOL/L (ref 20–33)
CREAT SERPL-MCNC: 0.67 MG/DL (ref 0.5–1.4)
GLOBULIN SER CALC-MCNC: 2.2 G/DL (ref 1.9–3.5)
GLUCOSE SERPL-MCNC: 161 MG/DL (ref 65–99)
HDLC SERPL-MCNC: 44 MG/DL
LDLC SERPL CALC-MCNC: 72 MG/DL
POTASSIUM SERPL-SCNC: 4.1 MMOL/L (ref 3.6–5.5)
PROT SERPL-MCNC: 6.5 G/DL (ref 6–8.2)
SODIUM SERPL-SCNC: 138 MMOL/L (ref 135–145)
TRIGL SERPL-MCNC: 113 MG/DL (ref 0–149)

## 2018-07-30 PROCEDURE — 36415 COLL VENOUS BLD VENIPUNCTURE: CPT

## 2018-07-30 PROCEDURE — 80061 LIPID PANEL: CPT

## 2018-07-30 PROCEDURE — 80053 COMPREHEN METABOLIC PANEL: CPT

## 2018-07-30 PROCEDURE — 83036 HEMOGLOBIN GLYCOSYLATED A1C: CPT | Mod: GA

## 2018-08-01 LAB
EST. AVERAGE GLUCOSE BLD GHB EST-MCNC: 154 MG/DL
HBA1C MFR BLD: 7 % (ref 0–5.6)

## 2018-08-08 ENCOUNTER — OFFICE VISIT (OUTPATIENT)
Dept: MEDICAL GROUP | Age: 68
End: 2018-08-08
Payer: MEDICARE

## 2018-08-08 VITALS
OXYGEN SATURATION: 94 % | HEART RATE: 82 BPM | WEIGHT: 149 LBS | DIASTOLIC BLOOD PRESSURE: 60 MMHG | SYSTOLIC BLOOD PRESSURE: 124 MMHG | BODY MASS INDEX: 27.42 KG/M2 | TEMPERATURE: 97.5 F | HEIGHT: 62 IN

## 2018-08-08 DIAGNOSIS — Z12.31 VISIT FOR SCREENING MAMMOGRAM: ICD-10-CM

## 2018-08-08 DIAGNOSIS — E11.21 CONTROLLED TYPE 2 DIABETES MELLITUS WITH DIABETIC NEPHROPATHY, WITHOUT LONG-TERM CURRENT USE OF INSULIN (HCC): ICD-10-CM

## 2018-08-08 DIAGNOSIS — Z78.0 POSTMENOPAUSAL: ICD-10-CM

## 2018-08-08 DIAGNOSIS — Z13.820 SCREENING FOR OSTEOPOROSIS: ICD-10-CM

## 2018-08-08 DIAGNOSIS — M25.561 ACUTE PAIN OF RIGHT KNEE: ICD-10-CM

## 2018-08-08 DIAGNOSIS — L57.0 AK (ACTINIC KERATOSIS): ICD-10-CM

## 2018-08-08 DIAGNOSIS — L82.0 INFLAMED SEBORRHEIC KERATOSIS: ICD-10-CM

## 2018-08-08 DIAGNOSIS — M85.80 OSTEOPENIA, UNSPECIFIED LOCATION: ICD-10-CM

## 2018-08-08 PROCEDURE — 17000 DESTRUCT PREMALG LESION: CPT | Mod: 59 | Performed by: FAMILY MEDICINE

## 2018-08-08 PROCEDURE — 17110 DESTRUCTION B9 LES UP TO 14: CPT | Performed by: FAMILY MEDICINE

## 2018-08-08 PROCEDURE — 99214 OFFICE O/P EST MOD 30 MIN: CPT | Mod: 25 | Performed by: FAMILY MEDICINE

## 2018-08-08 PROCEDURE — 17003 DESTRUCT PREMALG LES 2-14: CPT | Performed by: FAMILY MEDICINE

## 2018-08-08 ASSESSMENT — PATIENT HEALTH QUESTIONNAIRE - PHQ9: CLINICAL INTERPRETATION OF PHQ2 SCORE: 0

## 2018-08-08 NOTE — ASSESSMENT & PLAN NOTE
NEW PROBLEM    The patient states that she was recently seen at [facility] for right knee pain.  She was seen by a knee specialist she states.  She was diagnosed with osteoarthritis based on plain film.  She currently has been using physical therapy exercise to address this.

## 2018-08-08 NOTE — ASSESSMENT & PLAN NOTE
Patient states she has several more raised irritating plaque like lesions which flake crest and are painful.

## 2018-08-08 NOTE — LETTER
[facility]  Mainor Mckenzie M.D.  [address]   [place] [address]  Fax: [phone]   Authorization for Release/Disclosure of   Protected Health Information   Name: NERY SANTOS : 1950 SSN: [id number]   Address:  [address]  [place] [address] Phone:    [phone] (home)    I authorize the entity listed below to release/disclose the PHI below to:   [facility]/Mainor Mckenzie M.D. and Mainor Mckenzie M.D.   Provider or Entity Name:  [facility]   Address   City, State, [facility]   Phone:      Fax:     Reason for request: continuity of care   Information to be released:    [  ] LAST COLONOSCOPY,  including any PATH REPORT and follow-up  [  ] LAST FIT/COLOGUARD RESULT [  ] LAST DEXA  [  ] LAST MAMMOGRAM  [  ] LAST PAP  [  ] LAST LABS [  ] RETINA EXAM REPORT  [  ] IMMUNIZATION RECORDS  [  ] Release all info      [  ] Check here and initial the line next to each item to release ALL health information INCLUDING  _____ Care and treatment for drug and / or alcohol abuse  _____ HIV testing, infection status, or AIDS  _____ Genetic Testing    DATES OF SERVICE OR TIME PERIOD TO BE DISCLOSED: _____________  I understand and acknowledge that:  * This Authorization may be revoked at any time by you in writing, except if your health information has already been used or disclosed.  * Your health information that will be used or disclosed as a result of you signing this authorization could be re-disclosed by the recipient. If this occurs, your re-disclosed health information may no longer be protected by State or Federal laws.  * You may refuse to sign this Authorization. Your refusal will not affect your ability to obtain treatment.  * This Authorization becomes effective upon signing and will  on (date) __________.      If no date is indicated, this Authorization will  one (1) year from the signature date.    Name: Nery Watkins  Caitlyn    Signature:   Date:     8/8/2018       PLEASE FAX REQUESTED RECORDS BACK TO: [phone]

## 2018-08-08 NOTE — ASSESSMENT & PLAN NOTE
Since adding Januvia her hemoglobin A1c decreased from 8.3-7.0  She is also taking metformin 1000 mg p.o. twice daily  Glipizide 10 mg p.o. Daily    The patient denied any polyuria, no polydipsia, no polyphagia, no weight loss, no numbness or tingling anywhere, no change in vision.    The patient is on an ACE inhibitor, aspirin and a statin, also checks feet meticulously daily for ulcerations.    Retinal screening  Done this year per patient   microalbumin  overdue  Up to date on vaccinations  Due for shingrix

## 2018-08-08 NOTE — PROGRESS NOTES
This medical record contains text that has been entered with the assistance of computer voice recognition and dictation software.  Therefore, it may contain unintended errors in text, spelling, punctuation, or grammar    Chief Complaint   Patient presents with   • Hyperlipidemia     lab results         Nery Brady is a 68 y.o. female here evaluation and management of: labs, DM, htn,       HPI:     Diabetes mellitus type 2, controlled  Since adding Januvia her hemoglobin A1c decreased from 8.3-7.0  She is also taking metformin 1000 mg p.o. twice daily  Glipizide 10 mg p.o. Daily    The patient denied any polyuria, no polydipsia, no polyphagia, no weight loss, no numbness or tingling anywhere, no change in vision.    The patient is on an ACE inhibitor, aspirin and a statin, also checks feet meticulously daily for ulcerations.    Retinal screening  Done this year per patient   microalbumin  overdue  Up to date on vaccinations  Due for shingrix      Inflamed seborrheic keratosis  Patient states she has several more raised irritating plaque like lesions which flake crest and are painful.    Postmenopausal  Due for Dexa    Right knee pain  NEW PROBLEM    The patient states that she was recently seen at [facility] for right knee pain.  She was seen by a knee specialist she states.  She was diagnosed with osteoarthritis based on plain film.  She currently has been using physical therapy exercise to address this.    Current medicines (including changes today)  Current Outpatient Prescriptions   Medication Sig Dispense Refill   • SITagliptin (JANUVIA) 50 MG Tab Take 1 Tab by mouth every day. 90 Tab 0   • lisinopril (PRINIVIL) 5 MG Tab TAKE ONE TABLET BY MOUTH ONCE DAILY 90 Tab 0   • metformin (GLUCOPHAGE) 1000 MG tablet TAKE 1 TABLET BY MOUTH TWICE DAILY WITH MEALS 180 Tab 0   • simvastatin (ZOCOR) 20 MG Tab TAKE 1 TABLET BY MOUTH ONCE DAILY IN THE EVENING 90 Tab 0   • Blood Glucose Monitoring Suppl Supplies Misc  Test strips order: Test strips for Abbott [place] Lite meter. Sig: use bid  and prn ssx high or low sugar 100 Each 0   • Blood Glucose Monitoring Suppl Supplies Misc Test strips order: Test strips for Abbott [place] Lite meter. Sig: use bid  and prn ssx high or low sugar 100 Each 0   • Blood Glucose Monitoring Suppl Device Meter: Dispense Abbott Ming[place] Lite meter. Sig use BID and prn symptoms of low sugar . 1 Device 2   • Lancets Misc Lancets order: Lancets for Abbott [place] Lite meter. Sig: use bid  and prn ssx high or low sugar. 100 Each 3   • Blood Glucose Monitoring Suppl (FREESTYLE FREEDOM LITE) w/Device Kit USE AS DIRECTED FOR BLOOD SUGAR MONITORING. 1 Kit 0   • Lancets Misc Lancets order: Lancets for Abbott Mingus Lite meter. Sig: use bid and prn ssx high or low sugar. 100 Each 3   • simvastatin (ZOCOR) 20 MG Tab TAKE 1 TABLET BY MOUTH ONCE DAILY IN THE EVENING 90 Tab 0   • SITagliptin (JANUVIA) 50 MG Tab Take 1 Tab by mouth every day. 90 Tab 0   • glipiZIDE (GLUCOTROL) 10 MG Tab TAKE ONE TABLET BY MOUTH TWICE DAILY 180 Tab 0   • calcium acetate (PHOS-LO) 667 MG Cap Take 1,334 mg by mouth 3 times a day, with meals.     • Misc. Devices MISC by Does not apply route. DIABETIC TEST STRIPS AND LANCETS    SIG:  USE AS DIRECTED 3 Each 11     No current facility-administered medications for this visit.      She  has a past medical history of Diabetes and Hyperlipidemia. She also has no past medical history of Encounter for long-term (current) use of other medications.  She  has a past surgical history that includes laparoscopy; tonsillectomy; and cholecystectomy.  Social History   Substance Use Topics   • Smoking status: Never Smoker   • Smokeless tobacco: Never Used   • Alcohol use 0.0 oz/week      Comment: occasionally     Social History     Social History Narrative   • No narrative on file     Family History   Problem Relation Age of Onset   • Cancer Mother         melanoma   • No Known Problems Father   "    Family Status   Relation Status   • Mo  at age 62   • Fa  at age 85   • Sis Alive   • Bro Alive         ROS    Please see hpi     All other systems reviewed and are negative     Objective:     Blood pressure 124/60, pulse 82, temperature 36.4 °C (97.5 °F), height 1.575 m (5' 2\"), weight 67.6 kg (149 lb), SpO2 94 %, not currently breastfeeding. Body mass index is 27.25 kg/m².  Physical Exam:    Constitutional: Alert, no distress.  Skin: Warm, dry, good turgor, no rashes in visible areas.  Eye: Equal, round and reactive, conjunctiva clear, lids normal.  ENMT: Lips without lesions, good dentition, oropharynx clear.  Neck: Trachea midline, no masses, no thyromegaly. No cervical or supraclavicular lymphadenopathy.  Respiratory: Unlabored respiratory effort, lungs clear to auscultation, no wheezes, no ronchi.  Cardiovascular: Normal S1, S2, no murmur, no edema.  Abdomen: Soft, non-tender, no masses, no hepatosplenomegaly.  Psych: Alert and oriented x3, normal affect and mood.    SKIN EXAM    ISK  Description-- Several irregular, pigmented, verrucous surface plaques with dried hemmorhage      Size 0.4 cm on back  And legs    Symptoms  Patient has been complaining of lesions which have been irritating, painful and causing discomfort so is interested in removal.      AK  3  lesions on bilateral forearms, face,hands, and chest, with evidence of of solar damage present , spotty hyperpigmentation, scattered telangiectasias, and  Xerosis      PROCEDURE: CRYOTHERAPY  Discussed risks and benefits of cryotherapy including but not limited to scarring, hyperpigmentation, hypopigmentation, hypertrophic scarring, keiloid scarring, incomplete or no resolution of lesions treated,pain, undesirable cosemetic result, blistering, potential need for additional treatment including more invasive treatment. Patient expresses understanding and verbally acknowledges risks and consent to treatment. 2  applications of cryotherapy " with 3 second freeze thaw cycle was applied to  3AK's and  all irritated and inflamed Seborrheic Keratoses. Patient tolerated procedure well. There were no complications. Aftercare instructions given.            Assessment and Plan:   The following treatment plan was discussed      1. Screening for osteoporosis  Due for DEXA  ordered    2. Visit for screening mammogram  Overdue    - MA-SCREEN MAMMO W/CAD-BILAT; Future      3. Acute pain of right knee  We discussed that even if this is a meniscal tear  New guidelines recommend physical therapy before considering arthroscopy  Patient was instructed on activity modification ×2 weeks  Use the brace that  was given in clinic for 2 weeks with activity  NSAIDs when necessary  Ice when necessary and compression      5. Postmenopausal    - DS-BONE DENSITY STUDY (DEXA); Future    6. Controlled type 2 diabetes mellitus with diabetic nephropathy, without long-term current use of insulin (HCC)  Better controlled with the addition of Januvia    - MICROALBUMIN CREAT RATIO URINE; Future  - SITagliptin (JANUVIA) 50 MG Tab; Take 1 Tab by mouth every day.  Dispense: 90 Tab; Refill: 0    7. AK (actinic keratosis)  Patient tollerrated procedure well  There were no adverse events  Patient was given post procedure precautions       8. Inflamed seborrheic keratosis  Patient tollerrated procedure well  There were no adverse events  Patient was given post procedure precautions           HEALTH MAINTENANCE:    Instructed to Follow up in clinic or ER for worsening symptoms, difficulty breathing, lack of expected recovery, or should new symptoms or problems arise.    Followup: Return in about 6 months (around 2/8/2019) for Reevaluation, labs.       Once again this medical record contains text that has been entered with the assistance of computer voice recognition and dictation software.  Therefore, it may contain unintended errors in text, spelling, punctuation, or grammar

## 2018-08-20 ENCOUNTER — PATIENT OUTREACH (OUTPATIENT)
Dept: HEALTH INFORMATION MANAGEMENT | Facility: OTHER | Age: 68
End: 2018-08-20

## 2018-08-20 NOTE — PROGRESS NOTES
1. Attempt #:1    2. WebIZ Checked & Epic Updated: Yes  3. HealthConnect Verified: no  4. Verify PCP: yes    5. Communication Preference Obtained: yes    6. Diabetes Visit Scheduling  Scheduling Status:Not Scheduled. not due / SCP      7. Care Gap Scheduling (Attempt to Schedule EACH Overdue Care Gap!)    Health Maintenance Due   Topic Date Due   • RETINAL SCREENING  [birth year]04/1968   • BONE DENSITY  08/23/2015   • MAMMOGRAM  05/17/2016   • IMM ZOSTER VACCINES (2 of 3) 09/08/2016   • URINE ACR / MICROALBUMIN  06/19/2018   • Annual Wellness Visit  07/19/2018   • IMM INFLUENZA (1) 09/01/2018        8. Patient was directed to Health and Wellness Website: yes     - Patient plans to schedule appointment for Dexa Scan and Mammogram.    9. Screened for [employer] Prescription? yes  10. [employer] Activation: already active  11. one[employer] Elvin: no  12. Virtual Visits: no  13. Opt In to Text Messages: yes

## 2018-11-02 DIAGNOSIS — E11.8 CONTROLLED DIABETES MELLITUS TYPE 2 WITH COMPLICATIONS (HCC): ICD-10-CM

## 2018-11-06 RX ORDER — LISINOPRIL 5 MG/1
TABLET ORAL
Qty: 90 TAB | Refills: 0 | Status: SHIPPED | OUTPATIENT
Start: 2018-11-06 | End: 2018-12-14 | Stop reason: SDUPTHER

## 2018-12-13 DIAGNOSIS — E11.8 CONTROLLED DIABETES MELLITUS TYPE 2 WITH COMPLICATIONS (HCC): ICD-10-CM

## 2018-12-13 DIAGNOSIS — E11.8 TYPE 2 DIABETES MELLITUS WITH COMPLICATION, UNSPECIFIED WHETHER LONG TERM INSULIN USE: ICD-10-CM

## 2018-12-14 RX ORDER — GLIPIZIDE 10 MG/1
TABLET ORAL
Qty: 180 TAB | Refills: 1 | Status: SHIPPED | OUTPATIENT
Start: 2018-12-14 | End: 2019-02-07

## 2018-12-14 RX ORDER — LISINOPRIL 5 MG/1
TABLET ORAL
Qty: 90 TAB | Refills: 0 | Status: SHIPPED | OUTPATIENT
Start: 2018-12-14 | End: 2019-04-26 | Stop reason: SDUPTHER

## 2018-12-14 RX ORDER — LISINOPRIL 5 MG/1
TABLET ORAL
Qty: 90 TAB | Refills: 1 | Status: CANCELLED | OUTPATIENT
Start: 2018-12-14

## 2018-12-20 DIAGNOSIS — E11.9 DIABETES MELLITUS WITHOUT COMPLICATION (HCC): ICD-10-CM

## 2018-12-21 RX ORDER — BLOOD-GLUCOSE METER
KIT MISCELLANEOUS
Qty: 100 STRIP | Refills: 3 | Status: SHIPPED | OUTPATIENT
Start: 2018-12-21 | End: 2020-06-29 | Stop reason: SDUPTHER

## 2019-01-29 ENCOUNTER — TELEPHONE (OUTPATIENT)
Dept: MEDICAL GROUP | Age: 69
End: 2019-01-29

## 2019-01-29 DIAGNOSIS — E11.8 TYPE 2 DIABETES MELLITUS WITH COMPLICATION, WITHOUT LONG-TERM CURRENT USE OF INSULIN (HCC): ICD-10-CM

## 2019-02-05 ENCOUNTER — APPOINTMENT (OUTPATIENT)
Dept: MEDICAL GROUP | Age: 69
End: 2019-02-05
Payer: MEDICARE

## 2019-02-07 ENCOUNTER — OFFICE VISIT (OUTPATIENT)
Dept: MEDICAL GROUP | Age: 69
End: 2019-02-07
Payer: MEDICARE

## 2019-02-07 VITALS
TEMPERATURE: 97 F | HEART RATE: 76 BPM | HEIGHT: 62 IN | SYSTOLIC BLOOD PRESSURE: 100 MMHG | OXYGEN SATURATION: 95 % | BODY MASS INDEX: 27.79 KG/M2 | WEIGHT: 151 LBS | DIASTOLIC BLOOD PRESSURE: 66 MMHG

## 2019-02-07 DIAGNOSIS — M26.622 ARTHRALGIA OF LEFT TEMPOROMANDIBULAR JOINT: ICD-10-CM

## 2019-02-07 DIAGNOSIS — Z12.31 VISIT FOR SCREENING MAMMOGRAM: ICD-10-CM

## 2019-02-07 DIAGNOSIS — R68.84 PAIN IN BONE OF JAW: ICD-10-CM

## 2019-02-07 DIAGNOSIS — L82.0 INFLAMED SEBORRHEIC KERATOSIS: ICD-10-CM

## 2019-02-07 DIAGNOSIS — E11.21 CONTROLLED TYPE 2 DIABETES MELLITUS WITH DIABETIC NEPHROPATHY, WITHOUT LONG-TERM CURRENT USE OF INSULIN (HCC): ICD-10-CM

## 2019-02-07 DIAGNOSIS — Z00.00 PREVENTATIVE HEALTH CARE: ICD-10-CM

## 2019-02-07 DIAGNOSIS — Z78.0 POSTMENOPAUSAL: ICD-10-CM

## 2019-02-07 DIAGNOSIS — L57.0 AK (ACTINIC KERATOSIS): ICD-10-CM

## 2019-02-07 PROCEDURE — 17003 DESTRUCT PREMALG LES 2-14: CPT | Performed by: FAMILY MEDICINE

## 2019-02-07 PROCEDURE — 17000 DESTRUCT PREMALG LESION: CPT | Mod: 59 | Performed by: FAMILY MEDICINE

## 2019-02-07 PROCEDURE — 17110 DESTRUCTION B9 LES UP TO 14: CPT | Performed by: FAMILY MEDICINE

## 2019-02-07 PROCEDURE — 99213 OFFICE O/P EST LOW 20 MIN: CPT | Mod: 25 | Performed by: FAMILY MEDICINE

## 2019-02-07 ASSESSMENT — PATIENT HEALTH QUESTIONNAIRE - PHQ9: CLINICAL INTERPRETATION OF PHQ2 SCORE: 0

## 2019-02-07 NOTE — PROGRESS NOTES
This medical record contains text that has been entered with the assistance of computer voice recognition and dictation software.  Therefore, it may contain unintended errors in text, spelling, punctuation, or grammar    Chief Complaint   Patient presents with   • Pain     left jaw,4 months         Nery Brady is a 68 y.o. female here evaluation and management of: jaw pain      HPI:     Arthralgia of left temporomandibular joint  NEW PROBLEM    Patient is a 68-year-old female who presents to clinic with left jaw pain.  She states it is localized to the temporal mandibular joint is been going on for about 4 months.  She states that she was seen in urgent care and muscle relaxant was ordered which provided some relief.  However the pain has returned.  She describes the pain is a dull ache, localized to the TMJ, does not radiate, 4 out of 10.  The pain is exacerbated by opening her jaw sometimes it gets locked for a couple seconds may be.    Current medicines (including changes today)  Current Outpatient Prescriptions   Medication Sig Dispense Refill   • metformin (GLUCOPHAGE) 1000 MG tablet TAKE 1 TABLET BY MOUTH TWICE DAILY WITH MEALS 180 Tab 1   • FREESTYLE LITE strip USE 1 STRIP TWICE DAILY AND AS NEEDED FOR SIGNS/SYMPTOMS OF HIGH OR LOW SUGAR 100 Strip 3   • lisinopril (PRINIVIL) 5 MG Tab TAKE 1 TABLET BY MOUTH ONCE DAILY 90 Tab 0   • simvastatin (ZOCOR) 20 MG Tab TAKE 1 TABLET BY MOUTH ONCE DAILY IN THE EVENING 90 Tab 0   • simvastatin (ZOCOR) 20 MG Tab TAKE 1 TABLET BY MOUTH ONCE DAILY IN THE EVENING 90 Tab 0   • Blood Glucose Monitoring Suppl Supplies Misc Test strips order: Test strips for Abbott [place] Lite meter. Sig: use bid  and prn ssx high or low sugar 100 Each 0   • Blood Glucose Monitoring Suppl Supplies Misc Test strips order: Test strips for Abbott [place] Lite meter. Sig: use bid  and prn ssx high or low sugar 100 Each 0   • Blood Glucose Monitoring Suppl Device Meter: Dispense Abbott  "[place] Lite meter. Sig use BID and prn symptoms of low sugar . 1 Device 2   • Lancets Misc Lancets order: Lancets for Abbott [place] Lite meter. Sig: use bid  and prn ssx high or low sugar. 100 Each 3   • Blood Glucose Monitoring Suppl (FREESTYLE FREEDOM LITE) w/Device Kit USE AS DIRECTED FOR BLOOD SUGAR MONITORING. 1 Kit 0   • Lancets Misc Lancets order: Lancets for Abbott [place] Lite meter. Sig: use bid and prn ssx high or low sugar. 100 Each 3   • Misc. Devices MISC by Does not apply route. DIABETIC TEST STRIPS AND LANCETS    SIG:  USE AS DIRECTED 3 Each 11   • calcium acetate (PHOS-LO) 667 MG Cap Take 1,334 mg by mouth 3 times a day, with meals.       No current facility-administered medications for this visit.      She  has a past medical history of Diabetes and Hyperlipidemia. She also has no past medical history of Encounter for long-term (current) use of other medications.  She  has a past surgical history that includes laparoscopy; tonsillectomy; and cholecystectomy.  Social History   Substance Use Topics   • Smoking status: Never Smoker   • Smokeless tobacco: Never Used   • Alcohol use 0.0 oz/week      Comment: occasionally     Social History     Social History Narrative   • No narrative on file     Family History   Problem Relation Age of Onset   • Cancer Mother         melanoma   • No Known Problems Father      Family Status   Relation Status   • Mo  at age 62   • Fa  at age 85   • Sis Alive   • Bro Alive         ROS    Please see hpi     All other systems reviewed and are negative     Objective:     Blood pressure 100/66, pulse 76, temperature 36.1 °C (97 °F), temperature source Temporal, height 1.575 m (5' 2\"), weight 68.5 kg (151 lb), SpO2 95 %, not currently breastfeeding. Body mass index is 27.62 kg/m².  Physical Exam:    Constitutional: Alert, no distress.  Skin: Warm, dry, good turgor, no rashes in visible areas.  Eye: Equal, round and reactive, conjunctiva clear, lids " normal.  ENMT: Lips without lesions, good dentition, oropharynx clear.  Neck: Trachea midline, no masses, no thyromegaly. No cervical or supraclavicular lymphadenopathy.  Respiratory: Unlabored respiratory effort, lungs clear to auscultation, no wheezes, no ronchi.  Cardiovascular: Normal S1, S2, no murmur, no edema.  Abdomen: Soft, non-tender, no masses, no hepatosplenomegaly.  Psych: Alert and oriented x3, normal affect and mood.    Lesjose Glover - Benign  Date/Time: 2/7/2019 11:43 AM  Performed by: ANGEL LUIS HE  Authorized by: ANGEL LUIS HE     Number of Lesions: 2  Lesion 1:     Body area: head/neck    Head/neck location: forehead    Malignancy: benign lesion      Destruction method: cryotherapy    Lesion 2:     Body area: head/neck    Head/neck location: R eyelid    Malignancy: benign lesion      Destruction method: cryotherapy    Gilberto Glover - Pre-malignant  Date/Time: 2/7/2019 11:43 AM  Performed by: ANGEL LUIS HE  Authorized by: ANGEL LUIS HE     Number of Lesions: 3  Lesion 1:     Body area: upper extremity    Upper extremity location: R upper arm    Malignancy: malignancy unknown      Destruction method: cryotherapy    Lesion 2:     Body area: upper extremity    Upper extremity location: L  hand    Malignancy: malignancy unknown      Destruction method: cryotherapy    Lesion 3:     Body area: upper extremity    Upper extremity location: R upper arm    Malignancy: malignancy unknown      Destruction method: cryotherapy      Comment:     SKIN EXAM    ISK  Description--  *Several irregular, pigmented, verrucous surface plaques with dried hemmorhage      Size 0.2 cm on forehead and face    Symptoms  Patient has been complaining of lesions which have been irritating, bleeding when washing,  flaking,getting caught on clothing and jewelry, painful and causing discomfort so is interested in removal.      AK  multiple lesions on bilateral forearms, face,hands,  and chest, with evidence of of solar damage present , spotty hyperpigmentation, scattered telangiectasias, and  Xerosis      PROCEDURE: CRYOTHERAPY  Discussed risks and benefits of cryotherapy including but not limited to scarring, hyperpigmentation, hypopigmentation, hypertrophic scarring, keiloid scarring, incomplete or no resolution of lesions treated,pain, undesirable cosemetic result, blistering, potential need for additional treatment including more invasive treatment. Patient expresses understanding and verbally acknowledges risks and consent to treatment. 2  applications of cryotherapy with 3 second freeze thaw cycle was applied to all AK's and  all irritated and inflamed Seborrheic Keratoses. Patient tolerated procedure well. There were no complications. Aftercare instructions given.              Assessment and Plan:   The following treatment plan was discussed      1. Visit for screening mammogram  Due for breast cancer screening    - MA-SCREEN MAMMO W/CAD-BILAT; Future    2. Arthralgia of left temporomandibular joint  We reviewed isometric jaw exercises  Also encouraged her to use a brace and anti-inflammatories as needed      3. Preventative health care    - REFERRAL TO GYNECOLOGY    4. Controlled type 2 diabetes mellitus with diabetic nephropathy, without long-term current use of insulin (HCC)    Patient has been stable with current management  We will make no changes for now    - CBC WITHOUT DIFFERENTIAL; Future  - Comp Metabolic Panel; Future  - HEMOGLOBIN A1C; Future  - MICROALBUMIN CREAT RATIO URINE; Future    5. Postmenopausal    - DS-BONE DENSITY STUDY (DEXA); Future    6. AK (actinic keratosis)  Patient tollerrated procedure well  There were no adverse events  Patient was given post procedure precautions       7. Inflamed seborrheic keratosis  Patient tollerrated procedure well  There were no adverse events  Patient was given post procedure precautions         Instructed to Follow up in clinic or  ER for worsening symptoms, difficulty breathing, lack of expected recovery, or should new symptoms or problems arise.    Followup: Return in about 6 months (around 8/7/2019) for Reevaluation, labs.       Once again this medical record contains text that has been entered with the assistance of computer voice recognition and dictation software.  Therefore, it may contain unintended errors in text, spelling, punctuation, or grammar

## 2019-02-07 NOTE — ASSESSMENT & PLAN NOTE
NEW PROBLEM    Patient is a 68-year-old female who presents to clinic with left jaw pain.  She states it is localized to the temporal mandibular joint is been going on for about 4 months.  She states that she was seen in urgent care and muscle relaxant was ordered which provided some relief.  However the pain has returned.  She describes the pain is a dull ache, localized to the TMJ, does not radiate, 4 out of 10.  The pain is exacerbated by opening her jaw sometimes it gets locked for a couple seconds may be.

## 2019-04-26 DIAGNOSIS — E78.5 HYPERLIPIDEMIA: ICD-10-CM

## 2019-04-29 RX ORDER — SIMVASTATIN 20 MG
TABLET ORAL
Qty: 90 TAB | Refills: 0 | Status: SHIPPED | OUTPATIENT
Start: 2019-04-29 | End: 2019-06-21 | Stop reason: SDUPTHER

## 2019-04-29 RX ORDER — LISINOPRIL 5 MG/1
TABLET ORAL
Qty: 90 TAB | Refills: 0 | Status: SHIPPED | OUTPATIENT
Start: 2019-04-29 | End: 2019-06-21 | Stop reason: SDUPTHER

## 2019-06-21 DIAGNOSIS — E78.5 HYPERLIPIDEMIA: ICD-10-CM

## 2019-06-21 RX ORDER — LISINOPRIL 5 MG/1
TABLET ORAL
Qty: 90 TAB | Refills: 2 | Status: SHIPPED | OUTPATIENT
Start: 2019-06-21 | End: 2020-06-30

## 2019-06-21 RX ORDER — SIMVASTATIN 20 MG
TABLET ORAL
Qty: 90 TAB | Refills: 2 | Status: SHIPPED | OUTPATIENT
Start: 2019-06-21 | End: 2020-06-30

## 2019-08-07 ENCOUNTER — OFFICE VISIT (OUTPATIENT)
Dept: MEDICAL GROUP | Age: 69
End: 2019-08-07
Payer: MEDICARE

## 2019-08-07 VITALS
OXYGEN SATURATION: 95 % | WEIGHT: 157.6 LBS | DIASTOLIC BLOOD PRESSURE: 60 MMHG | SYSTOLIC BLOOD PRESSURE: 132 MMHG | BODY MASS INDEX: 29 KG/M2 | TEMPERATURE: 98.3 F | HEIGHT: 62 IN | HEART RATE: 81 BPM

## 2019-08-07 DIAGNOSIS — L82.0 SEBORRHEIC KERATOSIS, INFLAMED: ICD-10-CM

## 2019-08-07 DIAGNOSIS — L57.0 AK (ACTINIC KERATOSIS): ICD-10-CM

## 2019-08-07 DIAGNOSIS — Z23 NEED FOR VACCINATION: ICD-10-CM

## 2019-08-07 DIAGNOSIS — E11.21 CONTROLLED TYPE 2 DIABETES MELLITUS WITH DIABETIC NEPHROPATHY, WITHOUT LONG-TERM CURRENT USE OF INSULIN (HCC): ICD-10-CM

## 2019-08-07 DIAGNOSIS — Z11.59 NEED FOR HEPATITIS C SCREENING TEST: ICD-10-CM

## 2019-08-07 DIAGNOSIS — I10 ESSENTIAL HYPERTENSION: ICD-10-CM

## 2019-08-07 PROCEDURE — 17110 DESTRUCTION B9 LES UP TO 14: CPT | Performed by: FAMILY MEDICINE

## 2019-08-07 PROCEDURE — 90746 HEPB VACCINE 3 DOSE ADULT IM: CPT | Performed by: FAMILY MEDICINE

## 2019-08-07 PROCEDURE — 17000 DESTRUCT PREMALG LESION: CPT | Mod: 59 | Performed by: FAMILY MEDICINE

## 2019-08-07 PROCEDURE — G0010 ADMIN HEPATITIS B VACCINE: HCPCS | Performed by: FAMILY MEDICINE

## 2019-08-07 PROCEDURE — 17003 DESTRUCT PREMALG LES 2-14: CPT | Mod: 59 | Performed by: FAMILY MEDICINE

## 2019-08-07 PROCEDURE — 99214 OFFICE O/P EST MOD 30 MIN: CPT | Mod: 25 | Performed by: FAMILY MEDICINE

## 2019-08-07 NOTE — PROGRESS NOTES
This medical record contains text that has been entered with the assistance of computer voice recognition and dictation software.  Therefore, it may contain unintended errors in text, spelling, punctuation, or grammar    Chief Complaint   Patient presents with   • Follow-Up     6 month follow up          Nery Brady is a 69 y.o. female here evaluation and management of:       HPI:     1. Controlled type 2 diabetes mellitus with diabetic nephropathy, without long-term current use of insulin (HCC)    Patient with a history of diabetes that is currently controlled with Metformin 1000 mg BID with meals. A1c was 7.0 on 7/3/18, no recent blood work. She reports decreased exercise this summer as she has been baby sitting her grandchildren. She reports regularly following with optometry/opthalmology for retinal exams. She denies any recent episodes of hypoglycemia, worsening vision, or peripheral neuropathy. She reports performing regular feet checks. The patient denies  any polydipsia, polyuria, polyphagia, weight loss, no numbness or tingling anywhere, no changes in vision, no ulcerations or poor healing wounds.        2. Essential hypertension  Lisinopril 5 mg p.o. Daily    Home BP readings--- not monitoring    Today, the patient has been tollerating the BP meds without any issues. No tunnel vision, no cough, no changes in vision, no lightheadedness, no fatigue, no syncopal or presyncopal episodes, no edema, no new rashes.       3. Need for hepatitis C screening test  The patient was born between 1946 and 1966  So is due for hepatitis C screening      4. Need for vaccination  Due for hepatitis B vaccine  And shingrix    5. AK (actinic keratosis)    The patient is concerned about these pink, tender, growths, that have developed over the last several months. The patient is concerned about rough flaky lesions on sun exposed areas developing over the past few months.      6. Seborrheic keratosis, inflamed    The  patient noticed this lesion on skin and is concerned because of the following  reasons. The lesion has been enlarging inflamed,  irritated, itchy, and painfull,     Current medicines (including changes today)  Current Outpatient Medications   Medication Sig Dispense Refill   • Zoster Vac Recomb Adjuvanted (SHINGRIX) 50 MCG/0.5ML Recon Susp 0.5 mL by Intramuscular route Once for 1 dose. 0.5 mL 0   • lisinopril (PRINIVIL) 5 MG Tab TAKE 1 TABLET BY MOUTH ONCE DAILY 90 Tab 2   • metformin (GLUCOPHAGE) 1000 MG tablet TAKE 1 TABLET BY MOUTH TWICE DAILY WITH MEALS 180 Tab 1   • FREESTYLE LITE strip USE 1 STRIP TWICE DAILY AND AS NEEDED FOR SIGNS/SYMPTOMS OF HIGH OR LOW SUGAR 100 Strip 3   • simvastatin (ZOCOR) 20 MG Tab TAKE 1 TABLET BY MOUTH ONCE DAILY IN THE EVENING 90 Tab 0   • Blood Glucose Monitoring Suppl Supplies Misc Test strips order: Test strips for [employer] Lite meter. Sig: use bid  and prn ssx high or low sugar 100 Each 0   • Blood Glucose Monitoring Suppl Supplies Misc Test strips order: Test strips for [employer] Lite meter. Sig: use bid  and prn ssx high or low sugar 100 Each 0   • Blood Glucose Monitoring Suppl Device Meter: Dispense [employer] Lite meter. Sig use BID and prn symptoms of low sugar . 1 Device 2   • Lancets Misc Lancets order: Lancets for [employer] Lite meter. Sig: use bid  and prn ssx high or low sugar. 100 Each 3   • Blood Glucose Monitoring Suppl (FREESTYLE FREEDOM LITE) w/Device Kit USE AS DIRECTED FOR BLOOD SUGAR MONITORING. 1 Kit 0   • Lancets Misc Lancets order: Lancets for [employer] Lite meter. Sig: use bid and prn ssx high or low sugar. 100 Each 3   • Misc. Devices MISC by Does not apply route. DIABETIC TEST STRIPS AND LANCETS    SIG:  USE AS DIRECTED 3 Each 11   • simvastatin (ZOCOR) 20 MG Tab TAKE 1 TABLET BY MOUTH ONCE DAILY IN THE EVENING (Patient not taking: Reported on 8/7/2019) 90 Tab 2   • calcium acetate (PHOS-LO) 667 MG Cap Take 1,334 mg by mouth 3  "times a day, with meals.       No current facility-administered medications for this visit.      She  has a past medical history of Diabetes and Hyperlipidemia. She also has no past medical history of Encounter for long-term (current) use of other medications.  She  has a past surgical history that includes laparoscopy; tonsillectomy; and cholecystectomy.  Social History     Tobacco Use   • Smoking status: Never Smoker   • Smokeless tobacco: Never Used   Substance Use Topics   • Alcohol use: Yes     Alcohol/week: 0.0 oz     Comment: occasionally   • Drug use: No     Social History     Social History Narrative   • Not on file     Family History   Problem Relation Age of Onset   • Cancer Mother         melanoma   • No Known Problems Father      Family Status   Relation Name Status   • Mo   at age 62   • Fa   at age 85   • Sis 4 Alive   • Bro 4 Alive         ROS    Please see hpi     All other systems reviewed and are negative     Objective:     /60 (BP Location: Left arm, Patient Position: Sitting, BP Cuff Size: Adult)   Pulse 81   Temp 36.8 °C (98.3 °F) (Temporal)   Ht 1.575 m (5' 2\")   Wt 71.5 kg (157 lb 9.6 oz)   SpO2 95%  Body mass index is 28.83 kg/m².  Physical Exam:    Constitutional: Alert, no distress.  Skin:         SKIN EXAM    ISK  Description--  Several irregular, pigmented, verrucous surface plaques with dried hemmorhage      Size 0.2cm -0.4 cm on see procdoc      AK  multiple lesions on see procdoc with evidence of of tender, pink, scaly with solar damage present , spotty hyperpigmentation, scattered telangiectasias, and  xerosis  Gilberto Glover - Benign  Date/Time: 2019 10:49 AM  Performed by: Mainor Mckenzie M.D.  Authorized by: Mainor Mckenzie M.D.     Number of Lesions: 2  Lesion 1:     Body area: trunk    Trunk location: chest    Initial size (mm): 4    Final defect size (mm): 4    Malignancy: malignancy unknown      Destruction method: cryotherapy "      Cryo area: 4    Cryo cycles: 3  Lesion 2:     Body area: head/neck    Head/neck location: forehead    Initial size (mm): 3    Final defect size (mm): 3    Malignancy: malignancy unknown      Destruction method: cryotherapy      Cryo area: 3    Cryo cycles: 3  Lesn Destn - Pre-malignant  Date/Time: 8/7/2019 10:50 AM  Performed by: Mainor Mckenzie M.D.  Authorized by: Mainor Mckenzie M.D.     Number of Lesions: 3  Lesion 1:     Body area: head/neck    Head/neck location: forehead    Initial size (mm): 3    Final defect size (mm): 3    Malignancy: malignancy unknown      Destruction method: cryotherapy      Cryo area: 3    Cryo cycles: 3  Lesion 2:     Body area: head/neck    Head/neck location: forehead    Initial size (mm): 3    Final defect size (mm): 3    Malignancy: malignancy unknown      Destruction method: cryotherapy      Cryo area: 3    Cryo cycles: 3  Lesion 3:     Body area: head/neck    Head/neck location: L cheek    Initial size (mm): 3    Final defect size (mm): 3    Malignancy: malignancy unknown      Destruction method: cryotherapy      Cryo area: 3    Cryo cycles: 3        PROCEDURE: CRYOTHERAPY  Discussed risks and benefits of cryotherapy including but not limited to scarring, hyperpigmentation, hypopigmentation, hypertrophic scarring, keiloid scarring, incomplete or no resolution of lesions treated,pain, undesirable cosemetic result, blistering, potential need for additional treatment including more invasive treatment. Patient expresses understanding and verbally acknowledges risks and consent to treatment. 2  applications of cryotherapy with 3 second freeze thaw cycle was applied to all AK's and  all irritated and inflamed Seborrheic Keratoses. Patient tolerated procedure well. There were no complications. Aftercare instructions given.    Eye: Equal, round and reactive, conjunctiva clear, lids normal.  ENMT: Lips without lesions, good dentition, oropharynx clear.  Neck:  Trachea midline, no masses, no thyromegaly. No cervical or supraclavicular lymphadenopathy.  Respiratory: Unlabored respiratory effort, lungs clear to auscultation, no wheezes, no ronchi.  Cardiovascular: Normal S1, S2, no murmur, no edema.  Musk: Monofilament soft touch foot exam performed today, normal sensation bilaterally.  Psych: Alert and oriented x3, normal affect and mood.      Assessment and Plan:   The following treatment plan was discussed:      1. Controlled type 2 diabetes mellitus with diabetic nephropathy, without long-term current use of insulin (HCC)    Patient should continue Metformin 1000 mg BID with meals.     The patient was counseled on the followin. meticulous bilateral feet inspection daily  2. yearly dilated eye exams,   3. Wt. loss of 5% will decrease insulin resistance follow a low-fat diet and to begin an exercise program  4.  Pt. is not on baby aspirin  5. Patient  is on an ace inhibitor (lisinopril) ARB will check microalbumin yearly  6. BP goal is ,130/80, and LDL goal is <70  7. Hba1c goal is less than 8  8. Recommend cmp and A1C evaluation every 3 months  9. Check blood sugar twice daily  and minimum once daily at various times  10. Also needs diabetic education  11. Recommend vaccinations: Yearly Flu, Pneumvox, and hepatitis B vaccine  12. Yearly dental exam  13. Patient is on pravastatin    We will obtain new labs to update clinical profile.  Then we will adjust therapy as needed.    - Diabetic Monofilament LE Exam  - Lipid Profile; Future  - CBC WITHOUT DIFFERENTIAL; Future  - Comp Metabolic Panel; Future  - HEMOGLOBIN A1C; Future    2. Need for hepatitis C screening test    Patient is due for HepC screening which she was agreeable to and was ordered today.    - Hep C Virus Antibody; Future    3. Need for vaccination    Patient is due for HepB and Shingrex vaccines. She received HepB vaccine in clinic today after discussion of potential risks, benefits, and side  effects.    She should inquire at her local pharmacy for Shingrex vaccine due to clinic shortage.    - Hepatitis B Vaccine Adult 20+  - Zoster Vac Recomb Adjuvanted (SHINGRIX) 50 MCG/0.5ML Recon Susp; 0.5 mL by Intramuscular route Once for 1 dose.  Dispense: 0.5 mL; Refill: 0        4. Essential hypertension    Patient has been stable with current management  We will make no changes for now      4. Need for vaccination  Please provide patient with shingles vaccine     5. AK (actinic keratosis)    Patient tolerated procedure well  There were no adverse events  Patient was given post procedure precautions   Gilberto Glover - Benign  Date/Time: 8/7/2019 10:49 AM  Performed by: Mainor Mckenzie M.D.  Authorized by: Mainor Mckenzie M.D.     Number of Lesions: 2  Lesion 1:     Body area: trunk    Trunk location: chest    Initial size (mm): 4    Final defect size (mm): 4    Malignancy: malignancy unknown      Destruction method: cryotherapy      Cryo area: 4    Cryo cycles: 3  Lesion 2:     Body area: head/neck    Head/neck location: forehead    Initial size (mm): 3    Final defect size (mm): 3    Malignancy: malignancy unknown      Destruction method: cryotherapy      Cryo area: 3    Cryo cycles: 3  Gilberto Leungn - Pre-malignant  Date/Time: 8/7/2019 10:50 AM  Performed by: Mainor Mckenzie M.D.  Authorized by: Mainor Mckenzie M.D.     Number of Lesions: 3  Lesion 1:     Body area: head/neck    Head/neck location: forehead    Initial size (mm): 3    Final defect size (mm): 3    Malignancy: malignancy unknown      Destruction method: cryotherapy      Cryo area: 3    Cryo cycles: 3  Lesion 2:     Body area: head/neck    Head/neck location: forehead    Initial size (mm): 3    Final defect size (mm): 3    Malignancy: malignancy unknown      Destruction method: cryotherapy      Cryo area: 3    Cryo cycles: 3  Lesion 3:     Body area: head/neck    Head/neck location: L cheek    Initial size  (mm): 3    Final defect size (mm): 3    Malignancy: malignancy unknown      Destruction method: cryotherapy      Cryo area: 3    Cryo cycles: 3        6. Seborrheic keratosis, inflamed    Patient tolerated procedure well  There were no adverse events  Patient was given post procedure precautions   HEALTH MAINTENANCE:  - Due for DEXA, mammogram, and Zoster vaccine which she had requested to postpone.    Instructed to Follow up in clinic or ER for worsening symptoms, difficulty breathing, lack of expected recovery, or should new symptoms or problems arise.    Follow up: Return in about 3 months (around 11/7/2019) for Reevaluation.      Once again this medical record contains text that has been entered with the assistance of computer voice recognition, dictation software, and medical scribes.  Therefore, it may contain unintended errors in text, spelling, punctuation, or grammar.    IEwa (Scribe), am scribing for, and in the presence of, Mainor Jane M.D.    Electronically signed by: Ewa Arreaga (Mariahibmain), 8/7/2019     IMainor M.D. personally performed the services described in this documentation, as scribed by Ewa Arreaga in my presence, and it is both accurate and complete.

## 2019-08-23 DIAGNOSIS — E11.8 TYPE 2 DIABETES MELLITUS WITH COMPLICATION, WITHOUT LONG-TERM CURRENT USE OF INSULIN (HCC): ICD-10-CM

## 2019-11-12 DIAGNOSIS — E11.8 TYPE 2 DIABETES MELLITUS WITH COMPLICATION (HCC): ICD-10-CM

## 2019-11-12 NOTE — TELEPHONE ENCOUNTER
Was the patient seen in the last year in this department? Yes    Does patient have an active prescription for medications requested? No     Received Request Via: Pharmacy       If this medication is no longer available can we give her something else?  Pharmacy fax is down and they need this to be e scribed

## 2019-11-13 RX ORDER — GLIPIZIDE 10 MG/1
TABLET ORAL
Qty: 180 TAB | Refills: 1 | Status: SHIPPED | OUTPATIENT
Start: 2019-11-13 | End: 2020-03-24 | Stop reason: SDUPTHER

## 2020-01-16 LAB
ALBUMIN SERPL-MCNC: 4.4 G/DL (ref 3.5–4.8)
ALBUMIN/CREAT UR: 7.8 MG/G CREAT (ref 0–30)
ALBUMIN/GLOB SERPL: 1.9 {RATIO} (ref 1.2–2.2)
ALP SERPL-CCNC: 78 IU/L (ref 39–117)
ALT SERPL-CCNC: 15 IU/L (ref 0–32)
AST SERPL-CCNC: 14 IU/L (ref 0–40)
BASOPHILS # BLD AUTO: 0.1 X10E3/UL (ref 0–0.2)
BASOPHILS NFR BLD AUTO: 1 %
BILIRUB SERPL-MCNC: 0.7 MG/DL (ref 0–1.2)
BUN SERPL-MCNC: 14 MG/DL (ref 8–27)
BUN/CREAT SERPL: 20 (ref 12–28)
CALCIUM SERPL-MCNC: 9.4 MG/DL (ref 8.7–10.3)
CHLORIDE SERPL-SCNC: 100 MMOL/L (ref 96–106)
CO2 SERPL-SCNC: 24 MMOL/L (ref 20–29)
CREAT SERPL-MCNC: 0.71 MG/DL (ref 0.57–1)
CREAT UR-MCNC: 193.5 MG/DL
EOSINOPHIL # BLD AUTO: 0.4 X10E3/UL (ref 0–0.4)
EOSINOPHIL NFR BLD AUTO: 4 %
ERYTHROCYTE [DISTWIDTH] IN BLOOD BY AUTOMATED COUNT: 14.5 % (ref 11.7–15.4)
GLOBULIN SER CALC-MCNC: 2.3 G/DL (ref 1.5–4.5)
GLUCOSE SERPL-MCNC: 205 MG/DL (ref 65–99)
HBA1C MFR BLD: 7.7 % (ref 4.8–5.6)
HCT VFR BLD AUTO: 42 % (ref 34–46.6)
HGB BLD-MCNC: 14.1 G/DL (ref 11.1–15.9)
IMM GRANULOCYTES # BLD AUTO: 0 X10E3/UL (ref 0–0.1)
IMM GRANULOCYTES NFR BLD AUTO: 0 %
IMMATURE CELLS  115398: NORMAL
LYMPHOCYTES # BLD AUTO: 2.5 X10E3/UL (ref 0.7–3.1)
LYMPHOCYTES NFR BLD AUTO: 25 %
MCH RBC QN AUTO: 29.4 PG (ref 26.6–33)
MCHC RBC AUTO-ENTMCNC: 33.6 G/DL (ref 31.5–35.7)
MCV RBC AUTO: 88 FL (ref 79–97)
MICROALBUMIN UR-MCNC: 15 UG/ML
MONOCYTES # BLD AUTO: 0.7 X10E3/UL (ref 0.1–0.9)
MONOCYTES NFR BLD AUTO: 8 %
MORPHOLOGY BLD-IMP: NORMAL
NEUTROPHILS # BLD AUTO: 6.1 X10E3/UL (ref 1.4–7)
NEUTROPHILS NFR BLD AUTO: 62 %
NRBC BLD AUTO-RTO: NORMAL %
PLATELET # BLD AUTO: 308 X10E3/UL (ref 150–450)
POTASSIUM SERPL-SCNC: 5.1 MMOL/L (ref 3.5–5.2)
PROT SERPL-MCNC: 6.7 G/DL (ref 6–8.5)
RBC # BLD AUTO: 4.8 X10E6/UL (ref 3.77–5.28)
SODIUM SERPL-SCNC: 139 MMOL/L (ref 134–144)
WBC # BLD AUTO: 9.8 X10E3/UL (ref 3.4–10.8)

## 2020-01-20 NOTE — TELEPHONE ENCOUNTER
----- Message from Tammy Suggs MD sent at 1/13/2020  9:43 AM CST -----  Benign tubular adenoma, repeat 5 years.  Her labs are negative for celiac disease.  Message sent to pt via the portal.   Was the patient seen in the last year in this department? Yes    Does patient have an active prescription for medications requested? Yes    Received Request Via: Pharmacy

## 2020-02-07 ENCOUNTER — OFFICE VISIT (OUTPATIENT)
Dept: MEDICAL GROUP | Age: 70
End: 2020-02-07
Payer: MEDICARE

## 2020-02-07 VITALS
BODY MASS INDEX: 28.01 KG/M2 | HEIGHT: 62 IN | SYSTOLIC BLOOD PRESSURE: 130 MMHG | OXYGEN SATURATION: 96 % | TEMPERATURE: 98.7 F | DIASTOLIC BLOOD PRESSURE: 56 MMHG | HEART RATE: 78 BPM | WEIGHT: 152.2 LBS

## 2020-02-07 DIAGNOSIS — L82.0 INFLAMED SEBORRHEIC KERATOSIS: ICD-10-CM

## 2020-02-07 DIAGNOSIS — Z00.00 ANNUAL PHYSICAL EXAM: ICD-10-CM

## 2020-02-07 DIAGNOSIS — E78.00 HYPERCHOLESTEREMIA: ICD-10-CM

## 2020-02-07 DIAGNOSIS — Z23 NEED FOR VACCINATION: Primary | ICD-10-CM

## 2020-02-07 DIAGNOSIS — L57.0 AK (ACTINIC KERATOSIS): ICD-10-CM

## 2020-02-07 DIAGNOSIS — Z78.0 POSTMENOPAUSAL STATUS (AGE-RELATED) (NATURAL): ICD-10-CM

## 2020-02-07 DIAGNOSIS — I10 ESSENTIAL HYPERTENSION: ICD-10-CM

## 2020-02-07 DIAGNOSIS — E11.9 TYPE 2 DIABETES MELLITUS WITHOUT COMPLICATION, WITHOUT LONG-TERM CURRENT USE OF INSULIN (HCC): ICD-10-CM

## 2020-02-07 PROCEDURE — 17110 DESTRUCTION B9 LES UP TO 14: CPT | Performed by: FAMILY MEDICINE

## 2020-02-07 PROCEDURE — G0008 ADMIN INFLUENZA VIRUS VAC: HCPCS | Performed by: FAMILY MEDICINE

## 2020-02-07 PROCEDURE — 90662 IIV NO PRSV INCREASED AG IM: CPT | Performed by: FAMILY MEDICINE

## 2020-02-07 PROCEDURE — 17000 DESTRUCT PREMALG LESION: CPT | Mod: 59 | Performed by: FAMILY MEDICINE

## 2020-02-07 PROCEDURE — 90746 HEPB VACCINE 3 DOSE ADULT IM: CPT | Performed by: FAMILY MEDICINE

## 2020-02-07 PROCEDURE — 99214 OFFICE O/P EST MOD 30 MIN: CPT | Mod: 25 | Performed by: FAMILY MEDICINE

## 2020-02-07 PROCEDURE — G0010 ADMIN HEPATITIS B VACCINE: HCPCS | Performed by: FAMILY MEDICINE

## 2020-02-07 PROCEDURE — 17003 DESTRUCT PREMALG LES 2-14: CPT | Mod: 59 | Performed by: FAMILY MEDICINE

## 2020-02-07 ASSESSMENT — PATIENT HEALTH QUESTIONNAIRE - PHQ9: CLINICAL INTERPRETATION OF PHQ2 SCORE: 0

## 2020-02-07 NOTE — PROGRESS NOTES
This medical record contains text that has been entered with the assistance of computer voice recognition and dictation software.  Therefore, it may contain unintended errors in text, spelling, punctuation, or grammar    Chief Complaint   Patient presents with   • Diabetes   • Follow-Up     6 month   • Lab Results         Nery Santos is a 69 y.o. female here evaluation and management of: 6 month follow up        HPI:      1. Type 2 diabetes mellitus without complication, without long-term current use of insulin (HCC)    Patient has a chronic history of Type 2 Diabetes. She takes Glipizide 10 mg and Metformin 1000 mg twice daily as prescribed. She denies any medication side effects.  Patient denies any polydipsia no polyphagia no polyuria no numbness or tingling.    Results for NERY SANTOS (MRN [id number]) as of 2/7/2020 11:35   Ref. Range 1/15/2020 06:44   Glycohemoglobin Latest Ref Range: 4.8 - 5.6 % 7.7 (H)      Ref. Range 1/15/2020 06:44   Glucose Latest Ref Range: 65 - 99 mg/dL 205 (H)       2. Essential hypertension    Patient has a history of chronic hypertension and is taking Lisinopril 5 mg. No medication side effects were reported. She reportedly monitors her blood pressure regularly at home. Blood pressure is within goal today at 130/56. Denies chest pain, shortness of breath, or abdominal pain    3. Hypercholesteremia    The patient has a chronic history of hypercholesteremia. Currently taking Simvastatin 20 mg. No medication side effects or acute complaints of myalgias, abdominal pain, dizziness, claudication or chest pain. Lipid panel was last completed on 7/30/2018 as shown below.    4. AK (actinic keratosis)    The patient is concerned about these pink, tender, growths, that have developed over the last several months. The patient is concerned about rough flaky lesions on sun exposed areas developing over the past few months.    5. Inflamed seborrheic keratosis    The patient noticed  this lesion on arms and legs and is concerned because of the following  reasons. The lesion has been enlarging inflamed,   itchy, and painfull, rubbing on clothing,     6. Need for vaccination    Patient would like to receive her Influenza and Hep B vaccines today.    7. Postmenopausal status (age-related) (natural)  8. Menopausal state    Patient is overdue for a bone density scan.    Current medicines (including changes today)  Current Outpatient Medications   Medication Sig Dispense Refill   • Zoster Vac Recomb Adjuvanted (SHINGRIX) 50 MCG/0.5ML Recon Susp 0.5 mL by Intramuscular route Once for 1 dose. 0.5 mL 0   • glipiZIDE (GLUCOTROL) 10 MG Tab TAKE 1 TABLET BY MOUTH TWICE DAILY 180 Tab 1   • metformin (GLUCOPHAGE) 1000 MG tablet TAKE 1 TABLET BY MOUTH TWICE DAILY WITH MEALS 200 Tab 2   • lisinopril (PRINIVIL) 5 MG Tab TAKE 1 TABLET BY MOUTH ONCE DAILY 90 Tab 2   • FREESTYLE LITE strip USE 1 STRIP TWICE DAILY AND AS NEEDED FOR SIGNS/SYMPTOMS OF HIGH OR LOW SUGAR 100 Strip 3   • simvastatin (ZOCOR) 20 MG Tab TAKE 1 TABLET BY MOUTH ONCE DAILY IN THE EVENING 90 Tab 0   • Blood Glucose Monitoring Suppl Supplies Misc Test strips order: Test strips for [employer] Lite meter. Sig: use bid  and prn ssx high or low sugar 100 Each 0   • Blood Glucose Monitoring Suppl Supplies Misc Test strips order: Test strips for [employer] Lite meter. Sig: use bid  and prn ssx high or low sugar 100 Each 0   • Blood Glucose Monitoring Suppl Device Meter: Dispense [employer] Lite meter. Sig use BID and prn symptoms of low sugar . 1 Device 2   • Lancets Misc Lancets order: Lancets for [employer] Lite meter. Sig: use bid  and prn ssx high or low sugar. 100 Each 3   • Blood Glucose Monitoring Suppl (FREESTYLE [employer]RE[employer] LITE) w/Device Kit USE AS DIRECTED FOR BLOOD SUGAR MONITORING. 1 Kit 0   • Lancets Misc Lancets order: Lancets for [employer] Lite meter. Sig: use bid and prn ssx high or low sugar. 100 Each 3   • Misc.  "Devices MISC by Does not apply route. DIABETIC TEST STRIPS AND LANCETS    SIG:  USE AS DIRECTED 3 Each 11   • simvastatin (ZOCOR) 20 MG Tab TAKE 1 TABLET BY MOUTH ONCE DAILY IN THE EVENING (Patient not taking: Reported on 2019) 90 Tab 2   • calcium acetate (PHOS-LO) 667 MG Cap Take 1,334 mg by mouth 3 times a day, with meals.       No current facility-administered medications for this visit.      She  has a past medical history of Diabetes and Hyperlipidemia. She also has no past medical history of Encounter for long-term (current) use of other medications.  She  has a past surgical history that includes laparoscopy; tonsillectomy; and cholecystectomy.  Social History     Tobacco Use   • Smoking status: Never Smoker   • Smokeless tobacco: Never Used   Substance Use Topics   • Alcohol use: Yes     Alcohol/week: 0.0 oz     Comment: occasionally   • Drug use: No     Social History     Patient does not qualify to have social determinant information on file (likely too young).   Social History Narrative   • Not on file     Family History   Problem Relation Age of Onset   • Cancer Mother         melanoma   • No Known Problems Father      Family Status   Relation Name Status   • Mo   at age 62   • Fa   at age 85   • Sis 4 Alive   • Bro 4 Alive         ROS    Please see hpi     All other systems reviewed and are negative     Objective:     /56 (BP Location: Left arm, Patient Position: Sitting, BP Cuff Size: Adult)   Pulse 78   Temp 37.1 °C (98.7 °F) (Temporal)   Ht 1.575 m (5' 2\")   Wt 69 kg (152 lb 3.2 oz)   SpO2 96%  Body mass index is 27.84 kg/m².  Physical Exam:    Constitutional: Alert, no distress.      SKIN EXAM    ISK  Description--  #6  irregular, pigmented, verrucous surface plaques with dried hemmorhage      Size 0.2 cm-0.6cm on bilateral arms and legs      AK   # 5 on chin, arms and hands with evidence of tender, pink, scaly with solar damage present , spotty hyperpigmentation, " scattered telangiectasias, and  xerosis    Eye: Equal, round and reactive, conjunctiva clear, lids normal.  ENMT: Lips without lesions, good dentition, oropharynx clear.  Neck: Trachea midline, no masses, no thyromegaly. No cervical or supraclavicular lymphadenopathy.  Respiratory: Unlabored respiratory effort, lungs clear to auscultation, no wheezes, no ronchi.  Cardiovascular: Normal S1, S2, no murmur, no edema.  Psych: Alert and oriented x3, normal affect and mood.      Assessment and Plan:   The following treatment plan was discussed    1. Type 2 diabetes mellitus without complication, without long-term current use of insulin (HCC)    Patient's diabetes is well controlled with an A1C of 7.7. Stable on current medications, and we will continue the current dosages. I advised her to make diet changes to improve her glucose levels. Labs have been ordered for the next follow up visit.     - REFERRAL FOR RETINAL SCREENING EXAM; Future    2. Essential hypertension    Blood pressure is stable and within goal on current medications. We will continue the current dosages. I have advised her to avoid salty foods and exercise regularly.    3. Hypercholesteremia    Stable on current Simvastatin dosage. I have advised the patient to increase her exercise regimen and avoid fatty foods. Labs have been ordered for the next follow up visit.     4. AK (actinic keratosis)  Noted on exam,. Cryotherapy completed as noted below. Aftercare instructions were provided to the patient.     PROCEDURE: CRYOTHERAPY  Discussed risks and benefits of cryotherapy including but not limited to scarring, hyperpigmentation, hypopigmentation, hypertrophic scarring, keiloid scarring, incomplete or no resolution of lesions treated,pain, undesirable cosemetic result, blistering, potential need for additional treatment including more invasive treatment. Patient expresses understanding and verbally acknowledges risks and consent to treatment. 2   applications of cryotherapy with 3 second freeze thaw cycle was applied to all above lesions. Patient tolerated procedure well. There were no complications. Aftercare instructions given.    5. Inflamed seborrheic keratosis  Noted on exam,  Cryotherapy completed as noted below. Aftercare instructions were provided to the patient.       6. Need for vaccination    Influenza and Hep B vaccine given today without adverse effects. Provided patient with a prescription for the Shingles vaccine to have done at the pharmacy when it becomes available.    - INFLUENZA VACCINE, HIGH DOSE (65+ ONLY)  - Zoster Vac Recomb Adjuvanted (SHINGRIX) 50 MCG/0.5ML Recon Susp; 0.5 mL by Intramuscular route Once for 1 dose.  Dispense: 0.5 mL; Refill: 0  - Hepatitis B Vaccine Adult 20+    7. Postmenopausal status (age-related) (natural)    Patient is due for a bone density scan.    - DS-BONE DENSITY STUDY (DEXA)    8. Annual physical exam    Patient is due for updated labs.    - VITAMIN D,25 HYDROXY; Future  - TSH WITH REFLEX TO FT4; Future        HEALTH MAINTENANCE: Patient is due for Hep C screening, bone density, mammogram, Shingles vaccine, AWV, Lipid profile, influenza exam, and Hep B vaccine.    Instructed to Follow up in clinic or ER for worsening symptoms, difficulty breathing, lack of expected recovery, or should new symptoms or problems arise.    Followup: Return in about 6 months (around 8/7/2020) for Reevaluation, labs.      Once again this medical record contains text that has been entered with the assistance of computer voice recognition, dictation software, and medical scribes.  Therefore, it may contain unintended errors in text, spelling, punctuation, or grammar.    Mone TALLEY (Sweta), am scribing for, and in the presence of, Mainor Jane M.D.    Electronically signed by: Mone Chamberlain (Sweta), 2/7/2020     Mainor TALLEY M.D. personally performed the services described in this documentation, as scribed by Mone  Cintia in my presence, and it is both accurate and complete.

## 2020-03-24 DIAGNOSIS — E11.8 TYPE 2 DIABETES MELLITUS WITH COMPLICATION (HCC): ICD-10-CM

## 2020-03-25 RX ORDER — GLIPIZIDE 10 MG/1
TABLET ORAL
Qty: 180 TAB | Refills: 0 | Status: SHIPPED | OUTPATIENT
Start: 2020-03-25 | End: 2020-10-29

## 2020-04-17 DIAGNOSIS — E11.8 TYPE 2 DIABETES MELLITUS WITH COMPLICATION, WITHOUT LONG-TERM CURRENT USE OF INSULIN (HCC): ICD-10-CM

## 2020-05-22 ENCOUNTER — OFFICE VISIT (OUTPATIENT)
Dept: MEDICAL GROUP | Age: 70
End: 2020-05-22
Payer: MEDICARE

## 2020-05-22 VITALS
BODY MASS INDEX: 27.97 KG/M2 | HEIGHT: 62 IN | SYSTOLIC BLOOD PRESSURE: 136 MMHG | WEIGHT: 152 LBS | HEART RATE: 96 BPM | OXYGEN SATURATION: 96 % | DIASTOLIC BLOOD PRESSURE: 60 MMHG | TEMPERATURE: 98.5 F

## 2020-05-22 DIAGNOSIS — E11.8 TYPE 2 DIABETES MELLITUS WITH COMPLICATION, WITHOUT LONG-TERM CURRENT USE OF INSULIN (HCC): ICD-10-CM

## 2020-05-22 DIAGNOSIS — M25.511 ACUTE PAIN OF RIGHT SHOULDER: ICD-10-CM

## 2020-05-22 PROCEDURE — 99214 OFFICE O/P EST MOD 30 MIN: CPT | Performed by: FAMILY MEDICINE

## 2020-05-22 RX ORDER — NAPROXEN 500 MG/1
500 TABLET ORAL 2 TIMES DAILY WITH MEALS
Qty: 60 TAB | Refills: 0 | Status: SHIPPED | OUTPATIENT
Start: 2020-05-22 | End: 2020-10-29

## 2020-05-22 ASSESSMENT — FIBROSIS 4 INDEX: FIB4 SCORE: 0.83

## 2020-05-26 NOTE — PROGRESS NOTES
This medical record contains text that has been entered with the assistance of computer voice recognition and dictation software.  Therefore, it may contain unintended errors in text, spelling, punctuation, or grammar      Chief Complaint   Patient presents with   • Shoulder Pain     right side x 6 months   • Medication Management     discuss diabetes med options          Nery Brady is a 70 y.o. female here evaluation and management of: shoulder pain      HPI:     1. Acute pain of right shoulder  NEW PROBLEM    The patient is a 70-year-old female who presents to clinic with a chief complaint of right shoulder pain for the past 4 months or so.  She describes the pain is very sharp, 5 out of 10, prevents her from lifting things over her head using the arm for certain other movements.  Pain does not radiate, worse with use improves with rest.  She denies any trauma to the shoulder, she cannot think of any triggers that may have started the pain.  She denies any fevers no chills no popping locking no shoulder dislocations.  The patient states that she had an x-ray done at [place] which was not revealing of any pathology besides normal degenerative changes.      Current medicines (including changes today)  Current Outpatient Medications   Medication Sig Dispense Refill   • naproxen (NAPROSYN) 500 MG Tab Take 1 Tab by mouth 2 times a day, with meals. 60 Tab 0   • metformin (GLUCOPHAGE) 1000 MG tablet TAKE 1 TABLET BY MOUTH TWICE DAILY WITH MEALS 200 Tab 0   • glipiZIDE (GLUCOTROL) 10 MG Tab TAKE 1 TABLET BY MOUTH TWICE DAILY 180 Tab 0   • FREESTYLE LITE strip USE 1 STRIP TWICE DAILY AND AS NEEDED FOR SIGNS/SYMPTOMS OF HIGH OR LOW SUGAR 100 Strip 3   • simvastatin (ZOCOR) 20 MG Tab TAKE 1 TABLET BY MOUTH ONCE DAILY IN THE EVENING 90 Tab 0   • Blood Glucose Monitoring Suppl Supplies Misc Test strips order: Test strips for Abbott [place] Lite meter. Sig: use bid  and prn ssx high or low sugar 100 Each 0   •  Blood Glucose Monitoring Suppl Supplies Misc Test strips order: Test strips for Abbott [place] Lite meter. Sig: use bid  and prn ssx high or low sugar 100 Each 0   • Blood Glucose Monitoring Suppl Device Meter: Dispense Abbott [place] Lite meter. Sig use BID and prn symptoms of low sugar . 1 Device 2   • Lancets Misc Lancets order: Lancets for Abbott [place] Lite meter. Sig: use bid  and prn ssx high or low sugar. 100 Each 3   • Blood Glucose Monitoring Suppl (FREESTYLE [place]REEDOM LITE) w/Device Kit USE AS DIRECTED FOR BLOOD SUGAR MONITORING. 1 Kit 0   • Lancets Misc Lancets order: Lancets for Abbott [place] Lite meter. Sig: use bid and prn ssx high or low sugar. 100 Each 3   • Misc. Devices MISC by Does not apply route. DIABETIC TEST STRIPS AND LANCETS    SIG:  USE AS DIRECTED 3 Each 11   • lisinopril (PRINIVIL) 5 MG Tab TAKE 1 TABLET BY MOUTH ONCE DAILY (Patient not taking: Reported on 2020) 90 Tab 2   • simvastatin (ZOCOR) 20 MG Tab TAKE 1 TABLET BY MOUTH ONCE DAILY IN THE EVENING (Patient not taking: Reported on 2019) 90 Tab 2   • calcium acetate (PHOS-LO) 667 MG Cap Take 1,334 mg by mouth 3 times a day, with meals.       No current facility-administered medications for this visit.      She  has a past medical history of Diabetes and Hyperlipidemia. She also has no past medical history of Encounter for long-term (current) use of other medications.  She  has a past surgical history that includes laparoscopy; tonsillectomy; and cholecystectomy.  Social History     Tobacco Use   • Smoking status: Never Smoker   • Smokeless tobacco: Never Used   Substance Use Topics   • Alcohol use: Yes     Alcohol/week: 0.0 oz     Comment: occasionally   • Drug use: No     Social History     Social History Narrative   • Not on file     Family History   Problem Relation Age of Onset   • Cancer Mother         melanoma   • No Known Problems Father      Family Status   Relation Name Status   • Mo   at age 62   • Fa   " at age 85   • Sis 4 Alive   • Bro 4 Alive         ROS    The pertinent  ROS findings can be seen in the HPI above.     All other systems reviewed and are negative     Objective:     /60 (BP Location: Left arm, Patient Position: Sitting, BP Cuff Size: Adult)   Pulse 96   Temp 36.9 °C (98.5 °F) (Temporal)   Ht 1.575 m (5' 2\")   Wt 68.9 kg (152 lb)   SpO2 96%  Body mass index is 27.8 kg/m².      Physical Exam:    Constitutional: Alert, no distress.  Skin: no rashes  Eye: Equal, round and reactive, conjunctiva clear, lids normal.  ENMT: Lips without lesions, good dentition, oropharynx clear.  Neck: Trachea midline, no masses, no thyromegaly. No cervical or supraclavicular lymphadenopathy.  Respiratory: Unlabored respiratory effort, lungs clear to auscultation, no wheezes, no ronchi.  Cardiovascular: Normal S1, S2, no murmur, no edema  Abdomen: Soft, non-tender, no masses, no hepatosplenomegaly.  Shoulder-RIGHT--    \"NFL touchdown sign\" test unable to complete  NORMAL Apley scratch test NON tender to  direct palpation, NO asymmetry, muscle atrophy, or abnormal motion on direct inspection,  Pain with push-off , negative crossarm test negative spurlings, mild PAIN with Yergason, negative sulcus sign, negative Neers sign,  Painfull Guerrier    NO weakness on external rotation, no  weakness noted on internal rotation  ,+ painful arc sign      Assessment and Plan:   The following treatment plan was discussed      1. Acute pain of right shoulder    The patient is already had a plain film the pain persist and is interfering with function.  We will proceed with an MRI.    - MR-SHOULDER-W/O RIGHT; Future  - naproxen (NAPROSYN) 500 MG Tab; Take 1 Tab by mouth 2 times a day, with meals.  Dispense: 60 Tab; Refill: 0  - Comp Metabolic Panel; Future        Instructed to Follow up in clinic or ER for worsening symptoms, difficulty breathing, lack of expected recovery, or should new symptoms or problems " arise.    Followup: Return in about 4 weeks (around 6/19/2020) for Reevaluation.       Once again this medical record contains text that has been entered with the assistance of computer voice recognition and dictation software.  Therefore, it may contain unintended errors in text, spelling, punctuation, or grammar

## 2020-05-29 ENCOUNTER — PATIENT OUTREACH (OUTPATIENT)
Dept: SCHEDULING | Facility: IMAGING CENTER | Age: 70
End: 2020-05-29

## 2020-06-03 ENCOUNTER — OFFICE VISIT (OUTPATIENT)
Dept: MEDICAL GROUP | Age: 70
End: 2020-06-03
Payer: MEDICARE

## 2020-06-03 VITALS
WEIGHT: 153.2 LBS | BODY MASS INDEX: 28.19 KG/M2 | TEMPERATURE: 99.2 F | HEART RATE: 78 BPM | DIASTOLIC BLOOD PRESSURE: 66 MMHG | SYSTOLIC BLOOD PRESSURE: 124 MMHG | OXYGEN SATURATION: 96 % | HEIGHT: 62 IN

## 2020-06-03 DIAGNOSIS — M25.559 HIP PAIN: ICD-10-CM

## 2020-06-03 DIAGNOSIS — Z78.0 POSTMENOPAUSAL: ICD-10-CM

## 2020-06-03 DIAGNOSIS — Z12.31 ENCOUNTER FOR SCREENING MAMMOGRAM FOR BREAST CANCER: ICD-10-CM

## 2020-06-03 DIAGNOSIS — I10 ESSENTIAL HYPERTENSION: ICD-10-CM

## 2020-06-03 DIAGNOSIS — Z23 NEED FOR VACCINATION: ICD-10-CM

## 2020-06-03 DIAGNOSIS — E11.9 TYPE 2 DIABETES MELLITUS WITHOUT COMPLICATION, WITHOUT LONG-TERM CURRENT USE OF INSULIN (HCC): ICD-10-CM

## 2020-06-03 DIAGNOSIS — Z00.00 MEDICARE ANNUAL WELLNESS VISIT, INITIAL: ICD-10-CM

## 2020-06-03 DIAGNOSIS — E78.00 HYPERCHOLESTEREMIA: ICD-10-CM

## 2020-06-03 PROCEDURE — 90746 HEPB VACCINE 3 DOSE ADULT IM: CPT | Performed by: FAMILY MEDICINE

## 2020-06-03 PROCEDURE — G0439 PPPS, SUBSEQ VISIT: HCPCS | Performed by: FAMILY MEDICINE

## 2020-06-03 PROCEDURE — G0010 ADMIN HEPATITIS B VACCINE: HCPCS | Performed by: FAMILY MEDICINE

## 2020-06-03 RX ORDER — METHYLPREDNISOLONE 4 MG/1
TABLET ORAL
COMMUNITY
Start: 2020-05-13 | End: 2021-01-29

## 2020-06-03 SDOH — HEALTH STABILITY: MENTAL HEALTH: HOW MANY STANDARD DRINKS CONTAINING ALCOHOL DO YOU HAVE ON A TYPICAL DAY?: 1 OR 2

## 2020-06-03 SDOH — HEALTH STABILITY: MENTAL HEALTH: HOW OFTEN DO YOU HAVE A DRINK CONTAINING ALCOHOL?: MONTHLY OR LESS

## 2020-06-03 SDOH — HEALTH STABILITY: MENTAL HEALTH: HOW OFTEN DO YOU HAVE 6 OR MORE DRINKS ON ONE OCCASION?: NEVER

## 2020-06-03 ASSESSMENT — ACTIVITIES OF DAILY LIVING (ADL): BATHING_REQUIRES_ASSISTANCE: 0

## 2020-06-03 ASSESSMENT — PATIENT HEALTH QUESTIONNAIRE - PHQ9: CLINICAL INTERPRETATION OF PHQ2 SCORE: 0

## 2020-06-03 ASSESSMENT — FIBROSIS 4 INDEX: FIB4 SCORE: 0.83

## 2020-06-03 ASSESSMENT — PAIN SCALES - GENERAL: PAINLEVEL: NO PAIN

## 2020-06-03 ASSESSMENT — ENCOUNTER SYMPTOMS: GENERAL WELL-BEING: GOOD

## 2020-06-03 NOTE — PROGRESS NOTES
Chief Complaint   Patient presents with   • Annual Wellness Visit         HPI:  Nery is a 70 y.o. here for Medicare Annual Wellness Visit    The patient also is complaining of right hip pain.  She states that it started when she fell a few days back.  She states the pain is localized to the right lateral hip it does not radiate, no popping locking or instability.  She denies any loss of bladder or bowel function.      Patient Active Problem List    Diagnosis Date Noted   • Hip pain 06/03/2020   • Right shoulder pain 05/22/2020   • Essential hypertension 08/07/2019   • Arthralgia of left temporomandibular joint 02/07/2019   • Right knee pain 08/08/2018   • Exposure to mononucleosis syndrome 10/17/2017   • Inflamed seborrheic keratosis 07/14/2017   • AK (actinic keratosis) 07/14/2017   • Preventative health care 04/10/2017   • Postmenopausal 08/08/2016   • Visit for screening mammogram 08/08/2016   • Need for vaccination 08/08/2016   • Type 2 diabetes mellitus without complication, without long-term current use of insulin (HCC) 08/08/2016   • Osteopenia 08/25/2010   • Vitamin d deficiency 08/12/2010   • Hypercholesteremia 08/12/2010       Current Outpatient Medications   Medication Sig Dispense Refill   • metformin (GLUCOPHAGE) 1000 MG tablet TAKE 1 TABLET BY MOUTH TWICE DAILY WITH MEALS 200 Tab 0   • glipiZIDE (GLUCOTROL) 10 MG Tab TAKE 1 TABLET BY MOUTH TWICE DAILY 180 Tab 0   • lisinopril (PRINIVIL) 5 MG Tab TAKE 1 TABLET BY MOUTH ONCE DAILY 90 Tab 2   • FREESTYLE LITE strip USE 1 STRIP TWICE DAILY AND AS NEEDED FOR SIGNS/SYMPTOMS OF HIGH OR LOW SUGAR 100 Strip 3   • simvastatin (ZOCOR) 20 MG Tab TAKE 1 TABLET BY MOUTH ONCE DAILY IN THE EVENING 90 Tab 0   • Blood Glucose Monitoring Suppl Supplies Misc Test strips order: Test strips for Abbott [place] Lite meter. Sig: use bid  and prn ssx high or low sugar 100 Each 0   • Blood Glucose Monitoring Suppl Supplies Misc Test strips order: Test strips for Abbott [place]  Lite meter. Sig: use bid  and prn ssx high or low sugar 100 Each 0   • Blood Glucose Monitoring Suppl Device Meter: Dispense Abbott [place] Lite meter. Sig use BID and prn symptoms of low sugar . 1 Device 2   • Lancets Misc Lancets order: Lancets for Abbott [place] Lite meter. Sig: use bid  and prn ssx high or low sugar. 100 Each 3   • Blood Glucose Monitoring Suppl (FREESTYLE FREEDOM LITE) w/Device Kit USE AS DIRECTED FOR BLOOD SUGAR MONITORING. 1 Kit 0   • Lancets Misc Lancets order: Lancets for Abbott [place] Lite meter. Sig: use bid and prn ssx high or low sugar. 100 Each 3   • Misc. Devices MISC by Does not apply route. DIABETIC TEST STRIPS AND LANCETS    SIG:  USE AS DIRECTED 3 Each 11   • methylPREDNISolone (MEDROL DOSEPAK) 4 MG Tablet Therapy Pack TAKE BY MOUTH AS DIRECTED ON INSIDE OF PACKAGE     • naproxen (NAPROSYN) 500 MG Tab Take 1 Tab by mouth 2 times a day, with meals. (Patient not taking: Reported on 6/3/2020) 60 Tab 0   • simvastatin (ZOCOR) 20 MG Tab TAKE 1 TABLET BY MOUTH ONCE DAILY IN THE EVENING (Patient not taking: Reported on 8/7/2019) 90 Tab 2   • calcium acetate (PHOS-LO) 667 MG Cap Take 1,334 mg by mouth 3 times a day, with meals.       No current facility-administered medications for this visit.         Patient is taking medications as noted in medication list.  Current supplements as per medication list.     Allergies: Patient has no known allergies.    Current social contact/activities: bicycling, gardening, sowing, visits with friends/family.     Is patient current with immunizations? No, due for HEPATITIS B and SHINGRIX (Shingles). Patient is interested in receiving HEPATITIS B today.    She  reports that she has never smoked. She has never used smokeless tobacco. She reports current alcohol use. She reports that she does not use drugs.  Counseling given: Not Answered        DPA/Advanced directive: Patient does not have an Advanced Directive.  A packet and workshop information was  given on Advanced Directives.    ROS:    Gait: Uses no assistive device   Ostomy: No   Other tubes: No   Amputations: No   Chronic oxygen use No   Last eye exam: Dec. 2019  Wears hearing aids: No   : Denies any urinary leakage during the last 6 months      Screening:    Depression Screening    Little interest or pleasure in doing things?  0 - not at all  Feeling down, depressed, or hopeless? 0 - not at all  Patient Health Questionnaire Score: 0    If depressive symptoms identified deferred to follow up visit unless specifically addressed in assessment and plan.    Interpretation of PHQ-9 Total Score   Score Severity   1-4 No Depression   5-9 Mild Depression   10-14 Moderate Depression   15-19 Moderately Severe Depression   20-27 Severe Depression    Screening for Cognitive Impairment    Three Minute Recall (village, kitchen, baby)  2/3    John clock face with all 12 numbers and set the hands to show 10 past 10.  Yes 5/5  If cognitive concerns identified, deferred for follow up unless specifically addressed in assessment and plan.    Fall Risk Assessment    Has the patient had two or more falls in the last year or any fall with injury in the last year?  No  If fall risk identified, deferred for follow up unless specifically addressed in assessment and plan.    Safety Assessment    Throw rugs on floor.  Yes  Handrails on all stairs.  No  Good lighting in all hallways.  Yes  Difficulty hearing.  Yes  Patient counseled about all safety risks that were identified.    Functional Assessment ADLs    Are there any barriers preventing you from cooking for yourself or meeting nutritional needs?  No.    Are there any barriers preventing you from driving safely or obtaining transportation?  No.    Are there any barriers preventing you from using a telephone or calling for help?  No.    Are there any barriers preventing you from shopping?  No.    Are there any barriers preventing you from taking care of your own finances?  No.     Are there any barriers preventing you from managing your medications?  No.    Are there any barriers preventing you from showering, bathing or dressing yourself?  No.    Are you currently engaging in any exercise or physical activity?  Yes.  bicycle  What is your perception of your health?  Good.    Health Maintenance Summary                HEPATITIS C SCREENING Overdue 1949     RETINAL SCREENING Overdue 5/4/1967     BONE DENSITY Overdue 8/23/2015      Done 8/23/2010 DS-BONE DENSITY STUDY (DEXA)     Patient has more history with this topic...    MAMMOGRAM Overdue 5/17/2016      Done 11/17/2015 MA-CAD DIAGNOSTIC-MAMMO     Patient has more history with this topic...    FASTING LIPID PROFILE Overdue 7/30/2019      Done 7/30/2018 LIPID PROFILE     Patient has more history with this topic...    IMM ZOSTER VACCINES Overdue 5/11/2020      Done 3/16/2020 Ext Imm: Zoster Alan (Shingrix)     Patient has more history with this topic...    IMM HEP B VACCINE Next Due 6/7/2020      Done 2/7/2020 Imm Admin: Hepatitis B Vaccine Recombivax (Adol/Adult)     Patient has more history with this topic...    A1C SCREENING Next Due 7/15/2020      Done 1/15/2020 HEMOGLOBIN A1C     Patient has more history with this topic...    DIABETES MONOFILAMENT / LE EXAM Next Due 8/7/2020      Done 8/7/2019 AMB DIABETIC MONOFILAMENT LOWER EXTREMITY EXAM     Patient has more history with this topic...    URINE ACR / MICROALBUMIN Next Due 1/15/2021      Done 1/15/2020 MICROALB/CREAT RATIO RAND. UR     Patient has more history with this topic...    SERUM CREATININE Next Due 1/15/2021      Done 1/15/2020 COMP METABOLIC PANEL     Patient has more history with this topic...    Annual Wellness Visit Next Due 6/4/2021      Done 6/3/2020      Patient has more history with this topic...    COLONOSCOPY Next Due 10/2/2024      Done 10/2/2014 AMB REFERRAL TO GI FOR COLONOSCOPY    IMM DTaP/Tdap/Td Vaccine Next Due 6/23/2026      Done 6/23/2016 Imm Admin: Tdap  "Vaccine     Patient has more history with this topic...          Patient Care Team:  Mainor Jane M.D. as PCP - General (Family Medicine)    Social History     Tobacco Use   • Smoking status: Never Smoker   • Smokeless tobacco: Never Used   Substance Use Topics   • Alcohol use: Yes     Alcohol/week: 0.0 oz     Frequency: Monthly or less     Drinks per session: 1 or 2     Binge frequency: Never     Comment: occasionally   • Drug use: No     Family History   Problem Relation Age of Onset   • Cancer Mother         melanoma   • No Known Problems Father    • No Known Problems Sister    • Cancer Brother    • Leukemia Maternal Grandmother    • No Known Problems Maternal Grandfather    • No Known Problems Paternal Grandmother    • No Known Problems Paternal Grandfather    • No Known Problems Sister    • Lung Disease Sister    • Cancer Brother    • Diabetes Brother    • No Known Problems Brother      She  has a past medical history of Diabetes and Hyperlipidemia. She also has no past medical history of Encounter for long-term (current) use of other medications.   Past Surgical History:   Procedure Laterality Date   • CHOLECYSTECTOMY     • LAPAROSCOPY     • TONSILLECTOMY             Exam:     /66 (BP Location: Left arm, Patient Position: Sitting, BP Cuff Size: Adult)   Pulse 78   Temp 37.3 °C (99.2 °F) (Temporal)   Ht 1.568 m (5' 1.75\")   Wt 69.5 kg (153 lb 3.2 oz)   SpO2 96%  Body mass index is 28.25 kg/m².    Hearing excellent.    Dentition good  Alert, oriented in no acute distress.  Eye contact is good, speech goal directed, affect calm      Assessment and Plan. The following treatment and monitoring plan is recommended:    1. Medicare annual wellness visit, initial     2. Hip pain  DX-HIP-BILATERAL-WITH PELVIS-3/4 VIEWS   3. Need for vaccination  Hepatitis B Vaccine Adult IM   4. Encounter for screening mammogram for breast cancer  MA-SCREENING MAMMO BILAT W/CAD   5. Essential hypertension     6. Type 2 " diabetes mellitus without complication, without long-term current use of insulin (HCC)     7. Hypercholesteremia     8. Postmenopausal       Plan    1. Completed    2. Obtain plain film    3. Vaccine was administered today without adverse event.    4. Due for breast cancer screening    5. Patient has been stable with current management  We will make no changes for now    6. Patient has been stable with current management  We will make no changes for now      Services suggested: No services needed at this time  Health Care Screening recommendations as per orders if indicated.  Referrals offered: PT/OT/Nutrition counseling/Behavioral Health/Smoking cessation as per orders if indicated.    Discussion today about general wellness and lifestyle habits:    · Prevent falls and reduce trip hazards; Cautioned about securing or removing rugs.  · Have a working fire alarm and carbon monoxide detector;   · Engage in regular physical activity and social activities       Follow-up: Return in about 6 months (around 12/3/2020) for Reevaluation.

## 2020-06-26 DIAGNOSIS — E11.9 DIABETES MELLITUS WITHOUT COMPLICATION (HCC): ICD-10-CM

## 2020-06-26 DIAGNOSIS — E78.5 HYPERLIPIDEMIA: ICD-10-CM

## 2020-06-30 RX ORDER — LISINOPRIL 5 MG/1
TABLET ORAL
Qty: 30 TAB | Refills: 0 | Status: SHIPPED | OUTPATIENT
Start: 2020-06-30 | End: 2020-07-13 | Stop reason: SDUPTHER

## 2020-06-30 RX ORDER — BLOOD-GLUCOSE METER
KIT MISCELLANEOUS
Qty: 100 STRIP | Refills: 3 | Status: SHIPPED | OUTPATIENT
Start: 2020-06-30

## 2020-06-30 RX ORDER — SIMVASTATIN 20 MG
TABLET ORAL
Qty: 30 TAB | Refills: 0 | Status: SHIPPED | OUTPATIENT
Start: 2020-06-30 | End: 2020-10-29

## 2020-07-06 ENCOUNTER — HOSPITAL ENCOUNTER (OUTPATIENT)
Dept: RADIOLOGY | Facility: MEDICAL CENTER | Age: 70
End: 2020-07-06
Attending: FAMILY MEDICINE
Payer: MEDICARE

## 2020-07-06 DIAGNOSIS — M25.559 HIP PAIN: ICD-10-CM

## 2020-07-06 PROCEDURE — 73522 X-RAY EXAM HIPS BI 3-4 VIEWS: CPT

## 2020-07-09 DIAGNOSIS — E78.5 HYPERLIPIDEMIA: ICD-10-CM

## 2020-07-09 RX ORDER — SIMVASTATIN 20 MG
TABLET ORAL
Qty: 90 TAB | Refills: 2 | Status: SHIPPED | OUTPATIENT
Start: 2020-07-09 | End: 2021-04-23

## 2020-07-14 RX ORDER — LISINOPRIL 5 MG/1
TABLET ORAL
Qty: 90 TAB | Refills: 0 | Status: SHIPPED | OUTPATIENT
Start: 2020-07-14 | End: 2020-10-29

## 2020-07-14 NOTE — TELEPHONE ENCOUNTER
Received request via: Patient    Was the patient seen in the last year in this department? Yes  6/3/2020  Does the patient have an active prescription (recently filled or refills available) for medication(s) requested? No

## 2020-07-15 RX ORDER — LISINOPRIL 5 MG/1
TABLET ORAL
Qty: 90 TAB | Refills: 1 | Status: SHIPPED | OUTPATIENT
Start: 2020-07-15 | End: 2021-04-23

## 2020-08-04 DIAGNOSIS — S43.421A SPRAIN OF RIGHT ROTATOR CUFF CAPSULE, INITIAL ENCOUNTER: ICD-10-CM

## 2020-09-11 DIAGNOSIS — E11.8 TYPE 2 DIABETES MELLITUS WITH COMPLICATION, WITHOUT LONG-TERM CURRENT USE OF INSULIN (HCC): ICD-10-CM

## 2020-09-11 NOTE — TELEPHONE ENCOUNTER
Received request via: Pharmacy    Was the patient seen in the last year in this department? Yes    Does the patient have an active prescription (recently filled or refills available) for medication(s) requested? No     Dr. Jane out of the office patient needing medication before Monday.

## 2020-10-02 DIAGNOSIS — E11.8 TYPE 2 DIABETES MELLITUS WITH COMPLICATION, WITHOUT LONG-TERM CURRENT USE OF INSULIN (HCC): ICD-10-CM

## 2020-10-17 LAB
25(OH)D3+25(OH)D2 SERPL-MCNC: 28.1 NG/ML (ref 30–100)
ALBUMIN SERPL-MCNC: 4.4 G/DL (ref 3.7–4.7)
ALBUMIN/CREAT UR: 10 MG/G CREAT (ref 0–29)
ALBUMIN/GLOB SERPL: 2.3 {RATIO} (ref 1.2–2.2)
ALP SERPL-CCNC: 86 IU/L (ref 39–117)
ALT SERPL-CCNC: 14 IU/L (ref 0–32)
AST SERPL-CCNC: 13 IU/L (ref 0–40)
BASOPHILS # BLD AUTO: 0.1 X10E3/UL (ref 0–0.2)
BASOPHILS NFR BLD AUTO: 1 %
BILIRUB SERPL-MCNC: 0.8 MG/DL (ref 0–1.2)
BUN SERPL-MCNC: 13 MG/DL (ref 8–27)
BUN/CREAT SERPL: 16 (ref 12–28)
CALCIUM SERPL-MCNC: 9.2 MG/DL (ref 8.7–10.3)
CHLORIDE SERPL-SCNC: 100 MMOL/L (ref 96–106)
CHOLEST SERPL-MCNC: 124 MG/DL (ref 100–199)
CO2 SERPL-SCNC: 24 MMOL/L (ref 20–29)
CREAT SERPL-MCNC: 0.82 MG/DL (ref 0.57–1)
CREAT UR-MCNC: 140.6 MG/DL
EOSINOPHIL # BLD AUTO: 0.2 X10E3/UL (ref 0–0.4)
EOSINOPHIL NFR BLD AUTO: 3 %
ERYTHROCYTE [DISTWIDTH] IN BLOOD BY AUTOMATED COUNT: 13.9 % (ref 11.7–15.4)
GLOBULIN SER CALC-MCNC: 1.9 G/DL (ref 1.5–4.5)
GLUCOSE SERPL-MCNC: 265 MG/DL (ref 65–99)
HBA1C MFR BLD: 8.9 % (ref 4.8–5.6)
HCT VFR BLD AUTO: 40.1 % (ref 34–46.6)
HDLC SERPL-MCNC: 46 MG/DL
HGB BLD-MCNC: 13.6 G/DL (ref 11.1–15.9)
IMM GRANULOCYTES # BLD AUTO: 0 X10E3/UL (ref 0–0.1)
IMM GRANULOCYTES NFR BLD AUTO: 0 %
IMMATURE CELLS  115398: NORMAL
LABORATORY COMMENT REPORT: NORMAL
LDLC SERPL CALC-MCNC: 58 MG/DL (ref 0–99)
LYMPHOCYTES # BLD AUTO: 2.2 X10E3/UL (ref 0.7–3.1)
LYMPHOCYTES NFR BLD AUTO: 26 %
MCH RBC QN AUTO: 29.3 PG (ref 26.6–33)
MCHC RBC AUTO-ENTMCNC: 33.9 G/DL (ref 31.5–35.7)
MCV RBC AUTO: 86 FL (ref 79–97)
MICROALBUMIN UR-MCNC: 14.7 UG/ML
MONOCYTES # BLD AUTO: 0.6 X10E3/UL (ref 0.1–0.9)
MONOCYTES NFR BLD AUTO: 7 %
MORPHOLOGY BLD-IMP: NORMAL
NEUTROPHILS # BLD AUTO: 5.2 X10E3/UL (ref 1.4–7)
NEUTROPHILS NFR BLD AUTO: 63 %
NRBC BLD AUTO-RTO: NORMAL %
PLATELET # BLD AUTO: 275 X10E3/UL (ref 150–450)
POTASSIUM SERPL-SCNC: 4.6 MMOL/L (ref 3.5–5.2)
PROT SERPL-MCNC: 6.3 G/DL (ref 6–8.5)
RBC # BLD AUTO: 4.64 X10E6/UL (ref 3.77–5.28)
SODIUM SERPL-SCNC: 137 MMOL/L (ref 134–144)
TRIGL SERPL-MCNC: 108 MG/DL (ref 0–149)
TSH SERPL DL<=0.005 MIU/L-ACNC: 2.88 UIU/ML (ref 0.45–4.5)
VLDLC SERPL CALC-MCNC: 20 MG/DL (ref 5–40)
WBC # BLD AUTO: 8.4 X10E3/UL (ref 3.4–10.8)

## 2020-10-21 ENCOUNTER — TELEPHONE (OUTPATIENT)
Dept: MEDICAL GROUP | Age: 70
End: 2020-10-21

## 2020-10-21 DIAGNOSIS — E11.8 TYPE 2 DIABETES MELLITUS WITH COMPLICATION, WITHOUT LONG-TERM CURRENT USE OF INSULIN (HCC): ICD-10-CM

## 2020-10-21 NOTE — TELEPHONE ENCOUNTER
Phone Number Called: [phone] (home)       Call outcome: Spoke to patient regarding message below.    Message: Pt advised of Dr Jane's message, appt scheduled.

## 2020-10-21 NOTE — TELEPHONE ENCOUNTER
----- Message from Mainor Jane M.D. sent at 10/20/2020  2:41 PM PDT -----  Please call patient to inform that her diabetes is not well controlled, she needs to make an appointment and see me.  Also schedule her to be seen by diabetic RN, Caroline Lindquist.  Mainor Jane MD  [address] [address]

## 2020-10-29 ENCOUNTER — OFFICE VISIT (OUTPATIENT)
Dept: MEDICAL GROUP | Age: 70
End: 2020-10-29
Payer: MEDICARE

## 2020-10-29 VITALS
HEIGHT: 62 IN | TEMPERATURE: 97.2 F | SYSTOLIC BLOOD PRESSURE: 118 MMHG | WEIGHT: 147 LBS | DIASTOLIC BLOOD PRESSURE: 72 MMHG | BODY MASS INDEX: 27.05 KG/M2

## 2020-10-29 DIAGNOSIS — M25.511 ACUTE PAIN OF RIGHT SHOULDER: ICD-10-CM

## 2020-10-29 DIAGNOSIS — L57.0 ACTINIC KERATOSIS: ICD-10-CM

## 2020-10-29 DIAGNOSIS — E55.9 VITAMIN D DEFICIENCY: ICD-10-CM

## 2020-10-29 DIAGNOSIS — L82.0 INFLAMED SEBORRHEIC KERATOSIS: ICD-10-CM

## 2020-10-29 DIAGNOSIS — E11.8 TYPE 2 DIABETES MELLITUS WITH COMPLICATION, WITHOUT LONG-TERM CURRENT USE OF INSULIN (HCC): ICD-10-CM

## 2020-10-29 DIAGNOSIS — E78.00 HYPERCHOLESTEREMIA: ICD-10-CM

## 2020-10-29 DIAGNOSIS — Z11.59 NEED FOR HEPATITIS C SCREENING TEST: ICD-10-CM

## 2020-10-29 DIAGNOSIS — Z23 NEED FOR VACCINATION: ICD-10-CM

## 2020-10-29 PROCEDURE — 17000 DESTRUCT PREMALG LESION: CPT | Mod: 59 | Performed by: FAMILY MEDICINE

## 2020-10-29 PROCEDURE — 90662 IIV NO PRSV INCREASED AG IM: CPT | Performed by: FAMILY MEDICINE

## 2020-10-29 PROCEDURE — 17110 DESTRUCTION B9 LES UP TO 14: CPT | Performed by: FAMILY MEDICINE

## 2020-10-29 PROCEDURE — 99214 OFFICE O/P EST MOD 30 MIN: CPT | Mod: 25 | Performed by: FAMILY MEDICINE

## 2020-10-29 PROCEDURE — 17003 DESTRUCT PREMALG LES 2-14: CPT | Mod: 59 | Performed by: FAMILY MEDICINE

## 2020-10-29 PROCEDURE — G0008 ADMIN INFLUENZA VIRUS VAC: HCPCS | Performed by: FAMILY MEDICINE

## 2020-10-29 RX ORDER — GLIPIZIDE 5 MG/1
5 TABLET ORAL 2 TIMES DAILY
Qty: 60 TAB | Refills: 4 | Status: SHIPPED | OUTPATIENT
Start: 2020-10-29 | End: 2021-02-25 | Stop reason: SDUPTHER

## 2020-10-29 RX ORDER — MULTIVIT-MIN/IRON/FOLIC ACID/K 18-600-40
CAPSULE ORAL
COMMUNITY
End: 2021-01-29

## 2020-10-29 ASSESSMENT — FIBROSIS 4 INDEX: FIB4 SCORE: 0.9

## 2020-10-29 NOTE — PROGRESS NOTES
Subjective:   HPI 70 year old female with uncontrolled type 2 diabetes.   Health changes since last visit/interval Hx: None    1. Diabetes Medications:   Metformin 1000 mg bid  Glipizide 10 mg bid (stopped taking for several months due to hypoglycemia, just started again last week)  Taking above medications as prescribed: no see note above  Taking daily ASA: Not Indicated        2. Acute pain of right shoulder  The patient continues to complain of right shoulder pain.  X-ray of the shoulder was unremarkable.  She has not had a chance to do physical therapy as she continues to complain of pain.  Denies any popping locking or instability.  The pain is 4 out of 10, worse with overhead lifting, improves with rest and ibuprofen.    3. Vitamin D deficiency  NEW PROBLEM    Patient had not been taking vitamin D supplements.    Results for CARLTON SANTOS (MRN [id number]) as of 10/29/2020 08:48   Ref. Range 10/16/2020 06:05   25-Hydroxy   Vitamin D 25 Latest Ref Range: 30.0 - 100.0 ng/mL 28.1 (L)     4. Hypercholesteremia  Simvastatin 20 mg p.o. nightly    The patient has been on a statin for years and tolerating this fine. The patient denies any muscle aches, no abdominal pain and no history of elevated liver enzymes.    Results for CARLTON SANTOS (MRN [id number]) as of 10/29/2020 08:48   Ref. Range 10/16/2020 06:05   Cholesterol,Tot Latest Ref Range: 100 - 199 mg/dL 124   Triglycerides Latest Ref Range: 0 - 149 mg/dL 108   HDL Latest Ref Range: >39 mg/dL 46   LDL Chol Calc (NIH) Latest Ref Range: 0 - 99 mg/dL 58   VLDL Cholesterol Calc Latest Ref Range: 5 - 40 mg/dL 20       5. Need for hepatitis C screening test  The patient was born between 1946 and 1966  So is due for hepatitis C screening        6. Need for vaccination  Due for flu  States that she is up-to-date on Shingrix        7. AK (actinic keratosis)  Patient states she has some new worrisome red sun damage spots on her chest and arms.    8. Inflamed  "seborrheic keratosis  Patient states she has been scratching at this thick raised lesion on her back which bleeds and its may be on the bra strap.    Exercise: riding her bike and walking on occasion  Diet: \"healthy\" diet  in general  Frequently skips lunch  Patient's body mass index is 26.89 kg/m². Exercise and nutrition counseling were performed at this visit.      Health Maintenance:   Health Maintenance Due   Topic Date Due   • HEPATITIS C SCREENING  1950   • RETINAL SCREENING  05/04/1967   • BONE DENSITY  08/23/2015   • MAMMOGRAM  05/17/2016   • IMM ZOSTER VACCINES (3 of 3) 05/11/2020   • DIABETES MONOFILAMENT / LE EXAM  08/07/2020   • IMM INFLUENZA (1) 09/01/2020         DM:   Last A1c:   Lab Results   Component Value Date/Time    HBA1C 8.9 (H) 10/16/2020 06:05 AM    HBA1C 7.0 (H) 07/30/2018 12:41 PM      A1C GOAL: < 7    Glucose monitoring frequency: on occasion    Hypoglycemic episodes: yes - was having lows while on Glipizide    Last Retinal Exam: states completed last December (2019).    Daily Foot Exam: Yes       Lab Results   Component Value Date/Time    MICROALBCALC 5.5 06/19/2017 11:11 AM    MALBCRT 9 04/25/2013 03:35 PM    MICROALBUR 2.1 04/25/2013 03:35 PM    MICRALB 14.7 10/16/2020 06:05 AM        ACR Albumin/Creatinine Ratio goal <30     HTN:   Blood pressure goal <140/<80 .   Currently Rx ACE/ARB: Yes    Dyslipidemia:    Lab Results   Component Value Date/Time    CHOLSTRLTOT 124 10/16/2020 06:05 AM    CHOLSTRLTOT 139 07/30/2018 12:41 PM    LDL 72 07/30/2018 12:41 PM    HDL 46 10/16/2020 06:05 AM    HDL 44 07/30/2018 12:41 PM    TRIGLYCERIDE 108 10/16/2020 06:05 AM    TRIGLYCERIDE 113 07/30/2018 12:41 PM       Lab Results   Component Value Date/Time    SODIUM 137 10/16/2020 06:05 AM    SODIUM 138 07/30/2018 12:41 PM    POTASSIUM 4.6 10/16/2020 06:05 AM    POTASSIUM 4.1 07/30/2018 12:41 PM    CHLORIDE 100 10/16/2020 06:05 AM    CHLORIDE 102 07/30/2018 12:41 PM    CO2 24 10/16/2020 06:05 AM    " CO2 27 07/30/2018 12:41 PM    GLUCOSE 265 (H) 10/16/2020 06:05 AM    GLUCOSE 161 (H) 07/30/2018 12:41 PM    BUN 13 10/16/2020 06:05 AM    BUN 15 07/30/2018 12:41 PM    CREATININE 0.82 10/16/2020 06:05 AM    CREATININE 0.67 07/30/2018 12:41 PM    CREATININE 0.76 02/09/2012 10:24 AM    BUNCREATRAT 16 10/16/2020 06:05 AM    BUNCREATRAT 25 02/09/2012 10:24 AM    GLOMRATE >59 08/03/2010 09:35 AM     Lab Results   Component Value Date/Time    ALKPHOSPHAT 86 10/16/2020 06:05 AM    ALKPHOSPHAT 57 07/30/2018 12:41 PM    ASTSGOT 13 10/16/2020 06:05 AM    ASTSGOT 13 07/30/2018 12:41 PM    ALTSGPT 14 10/16/2020 06:05 AM    ALTSGPT 16 07/30/2018 12:41 PM    TBILIRUBIN 0.8 10/16/2020 06:05 AM    TBILIRUBIN 1.0 07/30/2018 12:41 PM        Currently Rx Statin: Yes    She  reports that she has never smoked. She has never used smokeless tobacco.    Objective:     Exam:  Monofilament: done   Monofilament testing with a 10 gram force: sensation intact: intact bilaterally  Visual Inspection: Feet without maceration, ulcers, fissures.  Pedal pulses: intact bilaterally     SKIN EXAM    ISK  Description--  1 irregular, pigmented, verrucous surface plaques with dried hemmorhage  On back behind braw strap    Size 0.4 cm       AK  3 lesions on chest and arms with evidence of of tender, pink, scaly with solar damage present ,  and  xerosis      PROCEDURE: CRYOTHERAPY  Discussed risks and benefits of cryotherapy including but not limited to scarring, hyperpigmentation, hypopigmentation, hypertrophic scarring, keiloid scarring, incomplete or no resolution of lesions treated,pain, undesirable cosemetic result, blistering, potential need for additional treatment including more invasive treatment. Patient expresses understanding and verbally acknowledges risks and consent to treatment. 2  applications of cryotherapy with 3 second freeze thaw cycle was applied to the above lesions.  Patient tolerated procedure well. There were no complications.  Aftercare instructions given.    Plan:     Discussed and educated on:   - All medications, side effects and compliance (discussed carefully)  - Annual eye examinations at Ophthalmology  - Foot Care: what to look for when checking feet every day  - HbA1C: target  - Home glucose monitoring emphasized  - Weight control and daily exercise    Recommended medication changes: Glipizide 5 mg bid.         - glipiZIDE (GLUCOTROL) 5 MG Tab; Take 1 Tab by mouth 2 times a day.  Dispense: 60 Tab; Refill: 4  - Diabetic Monofilament LE Exam    2. Acute pain of right shoulder    Proceed with physical therapy    - REFERRAL TO PHYSICAL THERAPY Reason for Therapy: Eval/Treat/Report    3. Vitamin D deficiency  Start OTC Vitamin D 2000 iu per day    4. Hypercholesteremia  Patient has been stable with current management  We will make no changes for now      5. Need for hepatitis C screening test       The [employer]PSTF recommends offering 1-time screening for HCV infection to adults born between  and  (baby boomers).    - HCV Scrn ( [id number] 1xLife); Future    6. Need for vaccination  Vaccine was administered today without adverse event.    - Influenza Vaccine, High Dose (65+ Only)      7. AK (actinic keratosis)  Patient tolerated procedure well  There were no adverse events  Patient was given post procedure precautions       8. Inflamed seborrheic keratosis  Patient tolerated procedure well  There were no adverse events  Patient was given post procedure precautions       Over 40minutes spent with patient face to face, greater than 50% time spent with plan/cordination of care as above in my A/P.  We discussed the importance of exercise, we reviewed the changes as discussed with our diabetic specialist.  We reviewed all the things required before her insurance will approve an MRI specifically physical therapy.  We discussed the excellent control of her cholesterol through the statin.  We also discussed the importance of vitamin D  replacement as well as vaccinations.

## 2020-11-04 DIAGNOSIS — E11.8 TYPE 2 DIABETES MELLITUS WITH COMPLICATION, WITHOUT LONG-TERM CURRENT USE OF INSULIN (HCC): ICD-10-CM

## 2020-11-04 NOTE — TELEPHONE ENCOUNTER
Received request via: Pharmacy    Was the patient seen in the last year in this department? Yes    Does the patient have an active prescription (recently filled or refills available) for medication(s) requested? No     **Requesting 90 day supply per insurance protocol.

## 2021-01-15 DIAGNOSIS — Z23 NEED FOR VACCINATION: ICD-10-CM

## 2021-01-29 ENCOUNTER — OFFICE VISIT (OUTPATIENT)
Dept: MEDICAL GROUP | Age: 71
End: 2021-01-29
Payer: MEDICARE

## 2021-01-29 VITALS
WEIGHT: 146.2 LBS | TEMPERATURE: 98.9 F | HEIGHT: 62 IN | SYSTOLIC BLOOD PRESSURE: 130 MMHG | HEART RATE: 75 BPM | BODY MASS INDEX: 26.91 KG/M2 | RESPIRATION RATE: 16 BRPM | DIASTOLIC BLOOD PRESSURE: 70 MMHG | OXYGEN SATURATION: 98 %

## 2021-01-29 DIAGNOSIS — Z78.0 POSTMENOPAUSAL: ICD-10-CM

## 2021-01-29 DIAGNOSIS — L82.0 INFLAMED SEBORRHEIC KERATOSIS: ICD-10-CM

## 2021-01-29 DIAGNOSIS — E78.00 HYPERCHOLESTEREMIA: ICD-10-CM

## 2021-01-29 DIAGNOSIS — L57.0 AK (ACTINIC KERATOSIS): ICD-10-CM

## 2021-01-29 DIAGNOSIS — Z23 NEED FOR VACCINATION: ICD-10-CM

## 2021-01-29 DIAGNOSIS — E11.9 TYPE 2 DIABETES MELLITUS WITHOUT COMPLICATION, WITHOUT LONG-TERM CURRENT USE OF INSULIN (HCC): ICD-10-CM

## 2021-01-29 PROCEDURE — 17110 DESTRUCTION B9 LES UP TO 14: CPT | Performed by: FAMILY MEDICINE

## 2021-01-29 PROCEDURE — 99214 OFFICE O/P EST MOD 30 MIN: CPT | Mod: 25 | Performed by: FAMILY MEDICINE

## 2021-01-29 PROCEDURE — 17003 DESTRUCT PREMALG LES 2-14: CPT | Mod: 59 | Performed by: FAMILY MEDICINE

## 2021-01-29 PROCEDURE — 17000 DESTRUCT PREMALG LESION: CPT | Mod: 59 | Performed by: FAMILY MEDICINE

## 2021-01-29 ASSESSMENT — FIBROSIS 4 INDEX: FIB4 SCORE: 0.9

## 2021-01-29 ASSESSMENT — PATIENT HEALTH QUESTIONNAIRE - PHQ9: CLINICAL INTERPRETATION OF PHQ2 SCORE: 0

## 2021-01-29 NOTE — PROGRESS NOTES
This medical record contains text that has been entered with the assistance of computer voice recognition and dictation software.  Therefore, it may contain unintended errors in text, spelling, punctuation, or grammar      Chief Complaint   Patient presents with   • Diabetes Follow-up   • Follow-Up     Rt. shoulder pain         Nery Santos is a 70 y.o. female here evaluation and management of: Routine follow-up      HPI:     1. Type 2 diabetes mellitus without complication, without long-term current use of insulin (HCC)  Metformin 1000 mg p.o. twice daily  Glipizide 5 mg p.o. twice daily  The patient is on an ACE inhibitor and a statin.  She denies any polyphagia polydipsia polyuria no unintentional weight loss.  This has not been under control based on last A1c of 8.9.  However she states that she has made changes in her diet which should reflect her A1c.    Results for NERY SANTOS (MRN [id number]) as of 1/29/2021 13:30   Ref. Range 10/16/2020 06:05   A-G Ratio Latest Ref Range: 1.2 - 2.2  2.3 (H)   Glycohemoglobin Latest Ref Range: 4.8 - 5.6 % 8.9 (H)       2. Postmenopausal  The patient is due for DEXA scan    3. Hypercholesteremia  Simvastatin 20 mg p.o. nightly    Patient has been tolerating her statin without any mention of muscle aches.  No history of elevated liver enzymes.    4. Need for vaccination  The patient states she is having a hard time logging on to [employer] to schedule her corona virus vaccine.    5. AK (actinic keratosis)  Patient states she has developed more red spots that are in sun exposed areas she would like to have them sprayed again.    6. Inflamed seborrheic keratosis  Patient continues to complain of itchy flaky bleeding barnacles on her arms and face.    Current medicines (including changes today)  Current Outpatient Medications   Medication Sig Dispense Refill   • COVID-19 mRNA Vaccine, Pfizer, (COVID-19 VACCINE) 30 MCG/0.3ML Suspension injection Inject 0.3 mL into the  shoulder, thigh, or buttocks one time for 1 dose. 0.3 mL 0   • metformin (GLUCOPHAGE) 1000 MG tablet Take 1 Tab by mouth 2 times a day, with meals. 180 Tab 0   • glipiZIDE (GLUCOTROL) 5 MG Tab Take 1 Tab by mouth 2 times a day. 60 Tab 4   • lisinopril (PRINIVIL) 5 MG Tab Take 1 tablet by mouth once daily 90 Tab 1   • simvastatin (ZOCOR) 20 MG Tab TAKE 1 TABLET BY MOUTH ONCE DAILY IN THE EVENING 90 Tab 2   • glucose blood (FREESTYLE LITE) strip USE 1 STRIP TWICE DAILY AND AS NEEDED FOR SIGNS/SYMPTOMS OF HIGH OR LOW SUGAR 100 Strip 3   • Blood Glucose Monitoring Suppl Supplies Misc Test strips order: Test strips for Abbott Wiley Lite meter. Sig: use bid  and prn ssx high or low sugar 100 Each 0   • Blood Glucose Monitoring Suppl Supplies Misc Test strips order: Test strips for Abbott Wiley Lite meter. Sig: use bid  and prn ssx high or low sugar 100 Each 0   • Blood Glucose Monitoring Suppl Device Meter: Dispense Abbott Wiley Lite meter. Sig use BID and prn symptoms of low sugar . 1 Device 2   • Lancets Misc Lancets order: Lancets for Abbott Wiley Lite meter. Sig: use bid  and prn ssx high or low sugar. 100 Each 3   • Blood Glucose Monitoring Suppl (FREESTYLE FREEDOM LITE) w/Device Kit USE AS DIRECTED FOR BLOOD SUGAR MONITORING. 1 Kit 0   • Lancets Misc Lancets order: Lancets for Abbott Wiley Lite meter. Sig: use bid and prn ssx high or low sugar. 100 Each 3   • Misc. Devices MISC by Does not apply route. DIABETIC TEST STRIPS AND LANCETS    SIG:  USE AS DIRECTED 3 Each 11     No current facility-administered medications for this visit.      She  has a past medical history of Diabetes and Hyperlipidemia. She also has no past medical history of Encounter for long-term (current) use of other medications.  She  has a past surgical history that includes laparoscopy; tonsillectomy; and cholecystectomy.  Social History     Tobacco Use   • Smoking status: Never Smoker   • Smokeless tobacco: Never Used   Substance Use  "Topics   • Alcohol use: Yes     Alcohol/week: 0.0 oz     Frequency: Monthly or less     Drinks per session: 1 or 2     Binge frequency: Never     Comment: occasionally   • Drug use: No     Social History     Social History Narrative   • Not on file     Family History   Problem Relation Age of Onset   • Cancer Mother         melanoma   • No Known Problems Father    • No Known Problems Sister    • Cancer Brother    • Leukemia Maternal Grandmother    • No Known Problems Maternal Grandfather    • No Known Problems Paternal Grandmother    • No Known Problems Paternal Grandfather    • No Known Problems Sister    • Lung Disease Sister    • Cancer Brother    • Diabetes Brother    • No Known Problems Brother      Family Status   Relation Name Status   • Mo   at age 62   • Fa   at age 85   • Sis Marcella Alive   • Bro Alonzo    • MGMo     • MGFa     • PGMo     • PGFa     • Sis Monica Alive   • Sis Gale Alive   • Bro Devon Alive   • Bro Matt Alive   • Bro Zay          ROS    The pertinent  ROS findings can be seen in the HPI above.     All other systems reviewed and are negative     Objective:     /70 (BP Location: Left arm, Patient Position: Sitting, BP Cuff Size: Adult)   Pulse 75   Temp 37.2 °C (98.9 °F) (Temporal)   Resp 16   Ht 1.575 m (5' 2\")   Wt 66.3 kg (146 lb 3.2 oz)   SpO2 98%  Body mass index is 26.74 kg/m².      Physical Exam:    Constitutional: Alert, no distress.  Skin:   SKIN EXAM    ISK  Description--  3irregular, pigmented, verrucous surface plaques with dried hemmorhage      Size 0.2cm -0.4 cm on  Forehead and left forearm       AK  2 lesions on facewith evidence of of tender, pink, scaly with solar damage present , spotty hyperpigmentation, scattered telangiectasias, and  xerosis      PROCEDURE: CRYOTHERAPY  Discussed risks and benefits of cryotherapy including but not limited to scarring, hyperpigmentation, hypopigmentation, " hypertrophic scarring, keiloid scarring, incomplete or no resolution of lesions treated,pain, undesirable cosemetic result, blistering, potential need for additional treatment including more invasive treatment. Patient expresses understanding and verbally acknowledges risks and consent to treatment. 2  applications of cryotherapy with 3 second freeze thaw cycle was applied to the above lesions.  Patient tolerated procedure well. There were no complications. Aftercare instructions given.    Eye: Equal, round and reactive, conjunctiva clear, lids normal.  ENMT: Lips without lesions, good dentition, oropharynx clear.  Neck: Trachea midline, no masses, no thyromegaly. No cervical or supraclavicular lymphadenopathy.  Respiratory: Unlabored respiratory effort, lungs clear to auscultation, no wheezes, no ronchi.  Cardiovascular: Normal S1, S2, no murmur, no edema  Abdomen: Soft, non-tender, no masses, no hepatosplenomegaly.        Assessment and Plan:   The following treatment plan was discussed      1. Type 2 diabetes mellitus without complication, without long-term current use of insulin (HCC)    We will obtain new labs to update clinical profile.  Then we will adjust therapy as needed.    - POCT Retinal Eye Exam  - MICROALBUMIN CREAT RATIO URINE; Future  - HEMOGLOBIN A1C; Future        - POCT Retinal Eye Exam    2. Postmenopausal    - DS-BONE DENSITY STUDY (DEXA); Future    3. Hypercholesteremia    We will obtain new labs to update clinical profile.  Then we will adjust therapy as needed.    - Comp Metabolic Panel; Future  - CBC WITHOUT DIFFERENTIAL; Future  - Lipid Profile; Future    4. Need for vaccination    I will give her a written order to take to our designated coronavirus vaccine section to see if she is able to get vaccinated.  She is having too hard of her time scheduling it online.    - COVID-19 mRNA Vaccine, Pfizer, (COVID-19 VACCINE) 30 MCG/0.3ML Suspension injection; Inject 0.3 mL into the shoulder, thigh,  or buttocks one time for 1 dose.  Dispense: 0.3 mL; Refill: 0  - DS-BONE DENSITY STUDY (DEXA); Future    5. AK (actinic keratosis)  Patient tolerated procedure well  There were no adverse events  Patient was given post procedure precautions       6. Inflamed seborrheic keratosis  Patient tolerated procedure well  There were no adverse events  Patient was given post procedure precautions         Instructed to Follow up in clinic or ER for worsening symptoms, difficulty breathing, lack of expected recovery, or should new symptoms or problems arise.    Followup: Return in about 6 months (around 7/29/2021) for Reevaluation, labs.

## 2021-02-06 ENCOUNTER — IMMUNIZATION (OUTPATIENT)
Dept: FAMILY PLANNING/WOMEN'S HEALTH CLINIC | Facility: IMMUNIZATION CENTER | Age: 71
End: 2021-02-06
Attending: INTERNAL MEDICINE
Payer: MEDICARE

## 2021-02-06 DIAGNOSIS — Z23 NEED FOR VACCINATION: ICD-10-CM

## 2021-02-06 DIAGNOSIS — Z23 ENCOUNTER FOR VACCINATION: Primary | ICD-10-CM

## 2021-02-06 PROCEDURE — 0001A PFIZER SARS-COV-2 VACCINE: CPT

## 2021-02-06 PROCEDURE — 91300 PFIZER SARS-COV-2 VACCINE: CPT

## 2021-02-24 ENCOUNTER — IMMUNIZATION (OUTPATIENT)
Dept: FAMILY PLANNING/WOMEN'S HEALTH CLINIC | Facility: IMMUNIZATION CENTER | Age: 71
End: 2021-02-24
Attending: INTERNAL MEDICINE
Payer: MEDICARE

## 2021-02-24 DIAGNOSIS — Z23 ENCOUNTER FOR VACCINATION: Primary | ICD-10-CM

## 2021-02-24 PROCEDURE — 91300 PFIZER SARS-COV-2 VACCINE: CPT

## 2021-02-24 PROCEDURE — 0002A PFIZER SARS-COV-2 VACCINE: CPT

## 2021-02-25 DIAGNOSIS — E11.8 TYPE 2 DIABETES MELLITUS WITH COMPLICATION, WITHOUT LONG-TERM CURRENT USE OF INSULIN (HCC): ICD-10-CM

## 2021-02-26 RX ORDER — GLIPIZIDE 5 MG/1
5 TABLET ORAL 2 TIMES DAILY
Qty: 60 TABLET | Refills: 4 | Status: SHIPPED | OUTPATIENT
Start: 2021-02-26 | End: 2021-10-21

## 2021-03-05 DIAGNOSIS — E11.8 TYPE 2 DIABETES MELLITUS WITH COMPLICATION, WITHOUT LONG-TERM CURRENT USE OF INSULIN (HCC): ICD-10-CM

## 2021-03-25 LAB
ALBUMIN SERPL-MCNC: 4.3 G/DL (ref 3.7–4.7)
ALBUMIN/GLOB SERPL: 2 {RATIO} (ref 1.2–2.2)
ALP SERPL-CCNC: 90 IU/L (ref 39–117)
ALT SERPL-CCNC: 11 IU/L (ref 0–32)
AST SERPL-CCNC: 13 IU/L (ref 0–40)
BILIRUB SERPL-MCNC: 0.5 MG/DL (ref 0–1.2)
BUN SERPL-MCNC: 16 MG/DL (ref 8–27)
BUN/CREAT SERPL: 25 (ref 12–28)
CALCIUM SERPL-MCNC: 9.5 MG/DL (ref 8.7–10.3)
CHLORIDE SERPL-SCNC: 103 MMOL/L (ref 96–106)
CHOLEST SERPL-MCNC: 137 MG/DL (ref 100–199)
CO2 SERPL-SCNC: 24 MMOL/L (ref 20–29)
CREAT SERPL-MCNC: 0.63 MG/DL (ref 0.57–1)
ERYTHROCYTE [DISTWIDTH] IN BLOOD BY AUTOMATED COUNT: 13.6 % (ref 11.7–15.4)
GLOBULIN SER CALC-MCNC: 2.1 G/DL (ref 1.5–4.5)
GLUCOSE SERPL-MCNC: 139 MG/DL (ref 65–99)
HCT VFR BLD AUTO: 39.8 % (ref 34–46.6)
HDLC SERPL-MCNC: 46 MG/DL
HGB BLD-MCNC: 13.3 G/DL (ref 11.1–15.9)
LABORATORY COMMENT REPORT: ABNORMAL
LDLC SERPL CALC-MCNC: 64 MG/DL (ref 0–99)
MCH RBC QN AUTO: 29.3 PG (ref 26.6–33)
MCHC RBC AUTO-ENTMCNC: 33.4 G/DL (ref 31.5–35.7)
MCV RBC AUTO: 88 FL (ref 79–97)
NRBC BLD AUTO-RTO: NORMAL %
PLATELET # BLD AUTO: 294 X10E3/UL (ref 150–450)
POTASSIUM SERPL-SCNC: 4.6 MMOL/L (ref 3.5–5.2)
PROT SERPL-MCNC: 6.4 G/DL (ref 6–8.5)
RBC # BLD AUTO: 4.54 X10E6/UL (ref 3.77–5.28)
SODIUM SERPL-SCNC: 141 MMOL/L (ref 134–144)
TRIGL SERPL-MCNC: 155 MG/DL (ref 0–149)
VLDLC SERPL CALC-MCNC: 27 MG/DL (ref 5–40)
WBC # BLD AUTO: 8.8 X10E3/UL (ref 3.4–10.8)

## 2021-04-22 DIAGNOSIS — E11.8 TYPE 2 DIABETES MELLITUS WITH COMPLICATION, WITHOUT LONG-TERM CURRENT USE OF INSULIN (HCC): ICD-10-CM

## 2021-04-22 DIAGNOSIS — E78.5 HYPERLIPIDEMIA: ICD-10-CM

## 2021-04-23 RX ORDER — SIMVASTATIN 20 MG
TABLET ORAL
Qty: 90 TABLET | Refills: 2 | Status: SHIPPED | OUTPATIENT
Start: 2021-04-23 | End: 2022-05-26 | Stop reason: SDUPTHER

## 2021-04-23 RX ORDER — LISINOPRIL 5 MG/1
TABLET ORAL
Qty: 90 TABLET | Refills: 2 | Status: SHIPPED | OUTPATIENT
Start: 2021-04-23 | End: 2022-02-21 | Stop reason: SDUPTHER

## 2021-06-15 ENCOUNTER — TELEPHONE (OUTPATIENT)
Dept: MEDICAL GROUP | Age: 71
End: 2021-06-15

## 2021-06-15 NOTE — TELEPHONE ENCOUNTER
Phone Number Called: [phone] (home)       Call outcome: Did not leave a detailed message. Requested patient to call back.    Message: LVM for pt to schedule 6 month with Dr Jane. Pt is due for appt late July. Please have pt complete fasting labs prior to appt (ordered).

## 2021-06-17 NOTE — TELEPHONE ENCOUNTER
Phone Number Called: [phone]    Call outcome: Spoke to patient regarding message below.    Message: Patient is scheduled for 7/27 at 10am, also aware of pending labs

## 2021-07-27 ENCOUNTER — OFFICE VISIT (OUTPATIENT)
Dept: MEDICAL GROUP | Age: 71
End: 2021-07-27
Payer: MEDICARE

## 2021-07-27 VITALS
SYSTOLIC BLOOD PRESSURE: 130 MMHG | TEMPERATURE: 99.2 F | RESPIRATION RATE: 16 BRPM | HEIGHT: 62 IN | WEIGHT: 152.4 LBS | BODY MASS INDEX: 28.05 KG/M2 | OXYGEN SATURATION: 94 % | DIASTOLIC BLOOD PRESSURE: 66 MMHG | HEART RATE: 82 BPM

## 2021-07-27 DIAGNOSIS — E11.9 TYPE 2 DIABETES MELLITUS WITHOUT COMPLICATION, WITHOUT LONG-TERM CURRENT USE OF INSULIN (HCC): ICD-10-CM

## 2021-07-27 DIAGNOSIS — L82.0 INFLAMED SEBORRHEIC KERATOSIS: ICD-10-CM

## 2021-07-27 DIAGNOSIS — Z12.31 ENCOUNTER FOR SCREENING MAMMOGRAM FOR BREAST CANCER: ICD-10-CM

## 2021-07-27 DIAGNOSIS — Z12.31 VISIT FOR SCREENING MAMMOGRAM: ICD-10-CM

## 2021-07-27 DIAGNOSIS — I10 ESSENTIAL HYPERTENSION: ICD-10-CM

## 2021-07-27 PROCEDURE — 17110 DESTRUCTION B9 LES UP TO 14: CPT | Performed by: FAMILY MEDICINE

## 2021-07-27 PROCEDURE — 99214 OFFICE O/P EST MOD 30 MIN: CPT | Mod: 25 | Performed by: FAMILY MEDICINE

## 2021-07-27 RX ORDER — METFORMIN HYDROCHLORIDE 1000 MG/1
TABLET, FILM COATED, EXTENDED RELEASE ORAL
Qty: 180 TABLET | Refills: 2 | Status: SHIPPED | OUTPATIENT
Start: 2021-07-27 | End: 2022-07-26 | Stop reason: SDUPTHER

## 2021-07-27 ASSESSMENT — FIBROSIS 4 INDEX: FIB4 SCORE: 0.96

## 2021-07-27 NOTE — PROGRESS NOTES
This medical record contains text that has been entered with the assistance of computer voice recognition and dictation software.  Therefore, it may contain unintended errors in text, spelling, punctuation, or grammar      Chief Complaint   Patient presents with   • Diabetes Follow-up         Nery Brady is a 71 y.o. female here evaluation and management of: diabetes, labs      HPI:     1. Type 2 diabetes mellitus without complication, without long-term current use of insulin (HCC)  Metformin  1000 mg p.o. twice daily  Glipizide 5 mg p.o. twice daily    The patient denied any polyuria, no polydipsia, no polyphagia, no weight loss, no numbness or tingling anywhere, no change in vision.    The patient is on an ACE inhibitor, aspirin and a statin, also checks feet meticulously daily for ulcerations.    Retinal screening  Up to date  microalbumin  Up to date  Up to date on vaccinations yes    2. Essential hypertension  Lisinopril 5 mg p.o. daily    Patient has been compliant denies any side effects such as cough, presyncope presyncopal/syncopal episodes, denies any chest pain no back pain or headaches.    3. Inflamed seborrheic keratosis  Patient states she has a couple more flaky itchy plaques on her legs.    4. Encounter for screening mammogram for breast cancer  Due for breast cancer screening      Current medicines (including changes today)  Current Outpatient Medications   Medication Sig Dispense Refill   • calcium citrate 315 mg-vitamin D 200 mg (CITRACAL+D) 315-200 MG-UNIT per tablet Take 1 tablet by mouth every day. 180 tablet 2   • metformin ER modified (GLUMETZA) 1000 MG TABLET SR 24 HR Take 1 tab po bid 180 tablet 2   • lisinopril (PRINIVIL) 5 MG Tab Take 1 tablet by mouth once daily 90 tablet 2   • metformin (GLUCOPHAGE) 1000 MG tablet TAKE 1 TABLET BY MOUTH TWICE DAILY WITH MEALS 90 tablet 2   • simvastatin (ZOCOR) 20 MG Tab TAKE 1 TABLET BY MOUTH ONCE DAILY IN THE EVENING 90 tablet 2   • glipiZIDE  (GLUCOTROL) 5 MG Tab Take 1 tablet by mouth 2 times a day. 60 tablet 4   • glucose blood (FREESTYLE LITE) strip USE 1 STRIP TWICE DAILY AND AS NEEDED FOR SIGNS/SYMPTOMS OF HIGH OR LOW SUGAR 100 Strip 3   • Blood Glucose Monitoring Suppl Supplies Misc Test strips order: Test strips for Abbott [place] Lite meter. Sig: use bid  and prn ssx high or low sugar 100 Each 0   • Blood Glucose Monitoring Suppl Supplies Misc Test strips order: Test strips for Abbott [place] Lite meter. Sig: use bid  and prn ssx high or low sugar 100 Each 0   • Blood Glucose Monitoring Suppl Device Meter: Dispense Abbott [place] Lite meter. Sig use BID and prn symptoms of low sugar . 1 Device 2   • Lancets Misc Lancets order: Lancets for Abbott [place] Lite meter. Sig: use bid  and prn ssx high or low sugar. 100 Each 3   • Blood Glucose Monitoring Suppl (FREESTYLE [place] LITE) w/Device Kit USE AS DIRECTED FOR BLOOD SUGAR MONITORING. 1 Kit 0   • Lancets Misc Lancets order: Lancets for Abbott [place] Lite meter. Sig: use bid and prn ssx high or low sugar. 100 Each 3   • Misc. Devices MISC by Does not apply route. DIABETIC TEST STRIPS AND LANCETS    SIG:  USE AS DIRECTED 3 Each 11     No current facility-administered medications for this visit.     She  has a past medical history of Diabetes and Hyperlipidemia. She also has no past medical history of Encounter for long-term (current) use of other medications.  She  has a past surgical history that includes laparoscopy; tonsillectomy; and cholecystectomy.  Social History     Tobacco Use   • Smoking status: Never Smoker   • Smokeless tobacco: Never Used   Vaping Use   • Vaping Use: Never used   Substance Use Topics   • Alcohol use: Yes     Alcohol/week: 0.0 oz     Comment: occasionally   • Drug use: No     Social History     Social History Narrative   • Not on file     Family History   Problem Relation Age of Onset   • Cancer Mother         melanoma   • No Known Problems Father    • No Known  "Problems Sister    • Cancer Brother    • Leukemia Maternal Grandmother    • No Known Problems Maternal Grandfather    • No Known Problems Paternal Grandmother    • No Known Problems Paternal Grandfather    • No Known Problems Sister    • Lung Disease Sister    • Cancer Brother    • Diabetes Brother    • No Known Problems Brother      Family Status   Relation Name Status   • Mo   at age 62   • Fa   at age 85   • Sis Marcella Alive   • Bro Alonzo    • MGMo     • MGFa     • PGMo     • PGFa     • Sis Monica Alive   • Sis Gale Alive   • Bro Devon Alive   • Bro Matt Alive   • Bro Zay          ROS    The pertinent  ROS findings can be seen in the HPI above.     All other systems reviewed and are negative     Objective:     /66 (BP Location: Left arm, Patient Position: Sitting, BP Cuff Size: Adult)   Pulse 82   Temp 37.3 °C (99.2 °F) (Temporal)   Resp 16   Ht 1.575 m (5' 2\")   Wt 69.1 kg (152 lb 6.4 oz)   SpO2 94%  Body mass index is 27.87 kg/m².      Physical Exam:    Constitutional: Alert, no distress.  Skin:     SKIN EXAM    ISK  Description--  2 irregular, pigmented, verrucous surface plaques with dried hemmorhage      Size 0.2cm -0.4 cm on left thigh          PROCEDURE: CRYOTHERAPY  Discussed risks and benefits of cryotherapy including but not limited to scarring, hyperpigmentation, hypopigmentation, hypertrophic scarring, keiloid scarring, incomplete or no resolution of lesions treated,pain, undesirable cosemetic result, blistering, potential need for additional treatment including more invasive treatment. Patient expresses understanding and verbally acknowledges risks and consent to treatment. 2  applications of cryotherapy with 3 second freeze thaw cycle was applied to the above lesions.  Patient tolerated procedure well. There were no complications. Aftercare instructions given.  Eye: Equal, round and reactive, conjunctiva clear, lids " normal.  ENMT: Lips without lesions, good dentition, oropharynx clear.  Neck: Trachea midline, no masses, no thyromegaly. No cervical or supraclavicular lymphadenopathy.  Respiratory: Unlabored respiratory effort, lungs clear to auscultation, no wheezes, no ronchi.  Cardiovascular: Normal S1, S2, no murmur, no edema  Abdomen: Soft, non-tender, no masses, no hepatosplenomegaly.      Hemoglobin A1c 6.3 in clinic today   Assessment and Plan:   The following treatment plan was discussed      1. Type 2 diabetes mellitus without complication, without long-term current use of insulin (HCC)  The patient spoke with her friend who is a pharmacist who recommended changing to extended release Metformin twice daily    - metformin ER modified (GLUMETZA) 1000 MG TABLET SR 24 HR; Take 1 tab po bid  Dispense: 180 tablet; Refill: 2    2. Essential hypertension    Patient has been stable with current management  We will make no changes for now      3. Inflamed seborrheic keratosis  Patient tolerated procedure well  There were no adverse events  Patient was given post procedure precautions       4. Encounter for screening mammogram for breast cancer    - MA-SCREENING MAMMO BILAT W/CAD; Future    5. Visit for screening mammogram                Instructed to Follow up in clinic or ER for worsening symptoms, difficulty breathing, lack of expected recovery, or should new symptoms or problems arise.    Followup: No follow-ups on file.

## 2021-08-03 ENCOUNTER — TELEPHONE (OUTPATIENT)
Dept: MEDICAL GROUP | Age: 71
End: 2021-08-03

## 2021-08-03 NOTE — TELEPHONE ENCOUNTER
VOICEMAIL  1. Caller Name: [facility]                      Call Back Number: [phone]    2. Message: Pharmacy sent a fax requesting a different script for the patients metformin. The patient insurance does not cover the Glumetza. They are requesting a prescription for Metformin ER 500MG BID. Please advise     3. Patient approves office to leave a detailed voicemail/MyChart message: no

## 2021-08-13 ENCOUNTER — HOSPITAL ENCOUNTER (OUTPATIENT)
Facility: MEDICAL CENTER | Age: 71
End: 2021-08-13
Attending: FAMILY MEDICINE
Payer: MEDICARE

## 2021-08-13 ENCOUNTER — OFFICE VISIT (OUTPATIENT)
Dept: MEDICAL GROUP | Age: 71
End: 2021-08-13
Payer: MEDICARE

## 2021-08-13 VITALS
TEMPERATURE: 99.3 F | WEIGHT: 151.8 LBS | HEIGHT: 62 IN | DIASTOLIC BLOOD PRESSURE: 56 MMHG | OXYGEN SATURATION: 96 % | HEART RATE: 78 BPM | BODY MASS INDEX: 27.94 KG/M2 | SYSTOLIC BLOOD PRESSURE: 126 MMHG

## 2021-08-13 DIAGNOSIS — D48.9 NEOPLASM OF UNCERTAIN BEHAVIOR: ICD-10-CM

## 2021-08-13 LAB — PATHOLOGY CONSULT NOTE: NORMAL

## 2021-08-13 PROCEDURE — 11104 PUNCH BX SKIN SINGLE LESION: CPT | Performed by: FAMILY MEDICINE

## 2021-08-13 PROCEDURE — 88305 TISSUE EXAM BY PATHOLOGIST: CPT

## 2021-08-13 ASSESSMENT — FIBROSIS 4 INDEX: FIB4 SCORE: 0.96

## 2021-08-13 ASSESSMENT — PAIN SCALES - GENERAL: PAINLEVEL: NO PAIN

## 2021-08-13 NOTE — PROGRESS NOTES
This medical record contains text that has been entered with the assistance of computer voice recognition and dictation software.  Therefore, it may contain unintended errors in text, spelling, punctuation, or grammar      Chief Complaint   Patient presents with   • Nevus     removal on right shoulder         Nery Brady is a 71 y.o. female here evaluation and management of: Mole on the right shoulder      HPI:     1. Neoplasm of uncertain behavior  Nery is a very pleasant 71-year-old female who presents to clinic with a chief complaint of having this itchy flaky growing mole on the right shoulder.  She states is been there for more than a year but is becoming more uncomfortable.    Current medicines (including changes today)  Current Outpatient Medications   Medication Sig Dispense Refill   • calcium citrate 315 mg-vitamin D 200 mg (CITRACAL+D) 315-200 MG-UNIT per tablet Take 1 tablet by mouth every day. 180 tablet 2   • lisinopril (PRINIVIL) 5 MG Tab Take 1 tablet by mouth once daily 90 tablet 2   • metformin (GLUCOPHAGE) 1000 MG tablet TAKE 1 TABLET BY MOUTH TWICE DAILY WITH MEALS 90 tablet 2   • simvastatin (ZOCOR) 20 MG Tab TAKE 1 TABLET BY MOUTH ONCE DAILY IN THE EVENING 90 tablet 2   • glipiZIDE (GLUCOTROL) 5 MG Tab Take 1 tablet by mouth 2 times a day. 60 tablet 4   • glucose blood (FREESTYLE LITE) strip USE 1 STRIP TWICE DAILY AND AS NEEDED FOR SIGNS/SYMPTOMS OF HIGH OR LOW SUGAR 100 Strip 3   • Blood Glucose Monitoring Suppl Supplies Misc Test strips order: Test strips for Abbott [place] Lite meter. Sig: use bid  and prn ssx high or low sugar 100 Each 0   • Blood Glucose Monitoring Suppl Device Meter: Dispense Abbott [place] Lite meter. Sig use BID and prn symptoms of low sugar . 1 Device 2   • Blood Glucose Monitoring Suppl (FREESTYLE FREEDOM LITE) w/Device Kit USE AS DIRECTED FOR BLOOD SUGAR MONITORING. 1 Kit 0   • Lancets Misc Lancets order: Lancets for Abbott [place] Lite meter. Sig: use bid  and prn ssx high or low sugar. 100 Each 3   • Misc. Devices MISC by Does not apply route. DIABETIC TEST STRIPS AND LANCETS    SIG:  USE AS DIRECTED 3 Each 11   • metformin (GLUCOPHAGE) 1000 MG tablet Take 1 tablet by mouth 2 times a day with meals. 180 tablet 0   • metformin ER modified (GLUMETZA) 1000 MG TABLET SR 24 HR Take 1 tab po bid 180 tablet 2   • Blood Glucose Monitoring Suppl Supplies Misc Test strips order: Test strips for Abbott [place] Lite meter. Sig: use bid  and prn ssx high or low sugar 100 Each 0   • Lancets Misc Lancets order: Lancets for Abbott [place] Lite meter. Sig: use bid  and prn ssx high or low sugar. 100 Each 3     No current facility-administered medications for this visit.     She  has a past medical history of Diabetes and Hyperlipidemia. She also has no past medical history of Encounter for long-term (current) use of other medications.  She  has a past surgical history that includes laparoscopy; tonsillectomy; and cholecystectomy.  Social History     Tobacco Use   • Smoking status: Never Smoker   • Smokeless tobacco: Never Used   Vaping Use   • Vaping Use: Never used   Substance Use Topics   • Alcohol use: Yes     Alcohol/week: 0.0 - 1.8 oz     Comment: occasionally   • Drug use: No     Social History     Social History Narrative   • Not on file     Family History   Problem Relation Age of Onset   • Cancer Mother         melanoma   • No Known Problems Father    • No Known Problems Sister    • Cancer Brother    • Leukemia Maternal Grandmother    • No Known Problems Maternal Grandfather    • No Known Problems Paternal Grandmother    • No Known Problems Paternal Grandfather    • No Known Problems Sister    • Lung Disease Sister    • Cancer Brother    • Diabetes Brother    • No Known Problems Brother      Family Status   Relation Name Status   • Mo   at age 62   • Fa   at age 85   • Sara Naranjo Alive   • Anibal Rosado    • MGMo     • MGFa     • PGMo   "   • Nick     • Sara Anderson Alive   • Sis Brunilda Alive   • Bro Devon Alive   • Bro Matt Alive   • Bro Zay          ROS    The pertinent  ROS findings can be seen in the HPI above.     All other systems reviewed and are negative     Objective:     /56 (BP Location: Left arm, Patient Position: Sitting, BP Cuff Size: Adult)   Pulse 78   Temp 37.4 °C (99.3 °F) (Temporal)   Ht 1.575 m (5' 2\")   Wt 68.9 kg (151 lb 12.8 oz)   SpO2 96%  Body mass index is 27.76 kg/m².      Physical Exam:    Constitutional: Alert, no distress.  Skin:     Excision---6 mm, irregular, assymmetric, honey/beige-colored plaque on right shoulder    After informed written consent was obtained, using Betadine for cleansing and 1% Lidocaine w- epinephrine for anesthetic, with sterile technique a 8 mm punch tool was used to obtain and excise  the lesion through dermis (full thickness) . Intent was to remove entire lesion with margins . Hemostasis was obtained by pressure and wound was   Sutured  With 3.0 Ethilon x2  Antibiotic dressing is applied, and wound care instructions provided. Be alert for any signs of cutaneous infection. The specimen is labeled and sent to pathology for evaluation. The procedure was well tolerated without complications.      Final repair length (measurement of repaired defect): 1.2cm        Assessment and Plan:   The following treatment plan was discussed      1. Neoplasm of uncertain behavior    Patient tolerated procedure well  There were no adverse events  Patient was given post procedure precautions     - Pathology Specimen; Future            Instructed to Follow up in clinic or ER for worsening symptoms, difficulty breathing, lack of expected recovery, or should new symptoms or problems arise.    Followup: Return in about 10 days (around 2021) for Suture Removal with MA.             "

## 2021-08-25 ENCOUNTER — NON-PROVIDER VISIT (OUTPATIENT)
Dept: MEDICAL GROUP | Age: 71
End: 2021-08-25
Payer: MEDICARE

## 2021-08-25 DIAGNOSIS — Z48.02 VISIT FOR SUTURE REMOVAL: ICD-10-CM

## 2021-08-25 PROCEDURE — 99999 PR NO CHARGE: CPT | Performed by: FAMILY MEDICINE

## 2021-08-26 NOTE — PROGRESS NOTES
Nery Brady is a 71 y.o. female here for a Non-Provider Visit for Suture Removal.    Sutures were placed by BK on date: 8/13/2021  Skin is healed: Yes  Provider notified if skin is not healed, or if there is redness, heat, pain, or drainage from incision: N\A  Sutures removed.   Mastisol and steristips are placed: No    Advised to use emollient (vaseline, aquaphor, etc.) as needed, avoid peroxide and antibiotic ointment to reduce irritation.     Path report has been reviewed by provider.  Path report has reviewed with patient.

## 2021-09-24 DIAGNOSIS — E11.9 DIABETES MELLITUS TYPE II, CONTROLLED (HCC): ICD-10-CM

## 2021-09-24 RX ORDER — LANCETS 30 GAUGE
EACH MISCELLANEOUS
Qty: 100 EACH | Refills: 3 | Status: SHIPPED | OUTPATIENT
Start: 2021-09-24

## 2021-10-20 DIAGNOSIS — E11.8 TYPE 2 DIABETES MELLITUS WITH COMPLICATION, WITHOUT LONG-TERM CURRENT USE OF INSULIN (HCC): ICD-10-CM

## 2021-10-21 RX ORDER — GLIPIZIDE 5 MG/1
5 TABLET ORAL 2 TIMES DAILY
Qty: 60 TABLET | Refills: 4 | Status: SHIPPED | OUTPATIENT
Start: 2021-10-21 | End: 2022-03-08 | Stop reason: SDUPTHER

## 2021-10-21 RX ORDER — GLIPIZIDE 5 MG/1
TABLET ORAL
Qty: 180 TABLET | Refills: 0 | Status: SHIPPED | OUTPATIENT
Start: 2021-10-21 | End: 2022-07-19 | Stop reason: SDUPTHER

## 2022-02-23 RX ORDER — LISINOPRIL 5 MG/1
5 TABLET ORAL DAILY
Qty: 90 TABLET | Refills: 2 | Status: SHIPPED | OUTPATIENT
Start: 2022-02-23 | End: 2022-10-21

## 2022-03-08 DIAGNOSIS — E11.8 TYPE 2 DIABETES MELLITUS WITH COMPLICATION, WITHOUT LONG-TERM CURRENT USE OF INSULIN (HCC): ICD-10-CM

## 2022-03-09 RX ORDER — GLIPIZIDE 5 MG/1
5 TABLET ORAL 2 TIMES DAILY
Qty: 60 TABLET | Refills: 4 | Status: SHIPPED | OUTPATIENT
Start: 2022-03-09 | End: 2022-10-21 | Stop reason: SDUPTHER

## 2022-05-26 DIAGNOSIS — E78.5 HYPERLIPIDEMIA: ICD-10-CM

## 2022-05-27 RX ORDER — SIMVASTATIN 20 MG
20 TABLET ORAL EVERY EVENING
Qty: 90 TABLET | Refills: 2 | Status: SHIPPED | OUTPATIENT
Start: 2022-05-27 | End: 2023-03-14

## 2022-07-19 DIAGNOSIS — E11.8 TYPE 2 DIABETES MELLITUS WITH COMPLICATION, WITHOUT LONG-TERM CURRENT USE OF INSULIN (HCC): ICD-10-CM

## 2022-07-19 NOTE — TELEPHONE ENCOUNTER
Received request via: Pharmacy Fax    Was the patient seen in the last year in this department? Yes    Does the patient have an active prescription (recently filled or refills available) for medication(s) requested? No

## 2022-07-20 RX ORDER — GLIPIZIDE 5 MG/1
5 TABLET ORAL 2 TIMES DAILY
Qty: 180 TABLET | Refills: 0 | Status: SHIPPED | OUTPATIENT
Start: 2022-07-20 | End: 2022-08-02

## 2022-07-26 DIAGNOSIS — E11.9 TYPE 2 DIABETES MELLITUS WITHOUT COMPLICATION, WITHOUT LONG-TERM CURRENT USE OF INSULIN (HCC): ICD-10-CM

## 2022-07-27 DIAGNOSIS — E11.9 TYPE 2 DIABETES MELLITUS WITHOUT COMPLICATION, WITHOUT LONG-TERM CURRENT USE OF INSULIN (HCC): ICD-10-CM

## 2022-07-27 DIAGNOSIS — Z11.59 NEED FOR HEPATITIS C SCREENING TEST: ICD-10-CM

## 2022-07-27 DIAGNOSIS — E55.9 VITAMIN D DEFICIENCY: ICD-10-CM

## 2022-07-27 RX ORDER — METFORMIN HYDROCHLORIDE 1000 MG/1
TABLET, FILM COATED, EXTENDED RELEASE ORAL
Qty: 180 TABLET | Refills: 2 | Status: SHIPPED | OUTPATIENT
Start: 2022-07-27 | End: 2022-08-02

## 2022-08-01 DIAGNOSIS — E11.8 TYPE 2 DIABETES MELLITUS WITH COMPLICATION, WITHOUT LONG-TERM CURRENT USE OF INSULIN (HCC): ICD-10-CM

## 2022-08-01 NOTE — TELEPHONE ENCOUNTER
Received request via: Patient    Was the patient seen in the last year in this department? Yes    Does the patient have an active prescription (recently filled or refills available) for medication(s) requested? No   PT REQUESTING NEW RX FOR METFORMIN, AS THE ONE ORDERED IS NOT COVERED BY INSURANCE.    PT ONLY HAS ONE PILL REMAINING!!

## 2022-08-02 ENCOUNTER — TELEPHONE (OUTPATIENT)
Dept: MEDICAL GROUP | Age: 72
End: 2022-08-02

## 2022-08-02 ENCOUNTER — OFFICE VISIT (OUTPATIENT)
Dept: MEDICAL GROUP | Age: 72
End: 2022-08-02
Payer: MEDICARE

## 2022-08-02 VITALS
BODY MASS INDEX: 27.82 KG/M2 | RESPIRATION RATE: 16 BRPM | TEMPERATURE: 96.9 F | HEART RATE: 98 BPM | OXYGEN SATURATION: 94 % | DIASTOLIC BLOOD PRESSURE: 54 MMHG | WEIGHT: 151.2 LBS | SYSTOLIC BLOOD PRESSURE: 114 MMHG | HEIGHT: 62 IN

## 2022-08-02 DIAGNOSIS — Z12.31 VISIT FOR SCREENING MAMMOGRAM: ICD-10-CM

## 2022-08-02 DIAGNOSIS — E78.00 HYPERCHOLESTEREMIA: ICD-10-CM

## 2022-08-02 DIAGNOSIS — Z78.0 POSTMENOPAUSAL: ICD-10-CM

## 2022-08-02 DIAGNOSIS — E11.8 TYPE 2 DIABETES MELLITUS WITH COMPLICATION, WITHOUT LONG-TERM CURRENT USE OF INSULIN (HCC): ICD-10-CM

## 2022-08-02 PROCEDURE — 99214 OFFICE O/P EST MOD 30 MIN: CPT | Performed by: FAMILY MEDICINE

## 2022-08-02 ASSESSMENT — PATIENT HEALTH QUESTIONNAIRE - PHQ9: CLINICAL INTERPRETATION OF PHQ2 SCORE: 0

## 2022-08-02 ASSESSMENT — FIBROSIS 4 INDEX: FIB4 SCORE: 0.99

## 2022-08-02 NOTE — TELEPHONE ENCOUNTER
MEDICATION PRIOR AUTHORIZATION NEEDED:    1. Name of Medication: Metformin    2. Requested By (Name of Pharmacy):   [id number] - [place], [place] - [address]  [address]  [place] [address]  Phone: [phone] Fax: [phone]       3. Is insurance on file current? yes    4. What is the name & phone number of the 3rd party payor? Cover my meds     Cover my meds key: [id number]

## 2022-08-02 NOTE — PROGRESS NOTES
This medical record contains text that has been entered with the assistance of computer voice recognition and dictation software.  Therefore, it may contain unintended errors in text, spelling, punctuation, or grammar      Chief Complaint   Patient presents with   • Follow-Up   • Lab Results     Labs not Done, ED 3/22/22         Nery Brady is a 72 y.o. female here evaluation and management of: Labs      HPI:     1. Type 2 diabetes mellitus with complication, without long-term current use of insulin (HCC)  Metformin 1000 mg p.o. twice daily  Glipizide 5 mg p.o. twice daily    The patient is due for retinal screening, she denies any polyphagia polydipsia, polyuria urea, no unintentional weight loss      2. Hypercholesteremia  Simvastatin 20 mg p.o. nightly    The patient has been on a statin for years and tolerating this fine. The patient denies any muscle aches, no abdominal pain and no history of elevated liver enzymes.      3. Visit for screening mammogram  Due for repeat mammogram    4. Postmenopausal  Is also due for repeat DEXA scan.    Current medicines (including changes today)  Current Outpatient Medications   Medication Sig Dispense Refill   • vitamin D3 (CHOLECALCIFEROL) 400 UNIT Tab Take 1,000 Units by mouth every day.     • metformin (GLUCOPHAGE) 1000 MG tablet Take 1 Tablet by mouth 2 times a day with meals. 180 Tablet 2   • simvastatin (ZOCOR) 20 MG Tab Take 1 Tablet by mouth every evening. 90 Tablet 2   • glipiZIDE (GLUCOTROL) 5 MG Tab Take 1 Tablet by mouth 2 times a day. 60 Tablet 4   • lisinopril (PRINIVIL) 5 MG Tab Take 1 Tablet by mouth every day. 90 Tablet 2   • Lancets Lancets order: Lancets for Abbott [place] Lite meter. Sig: use bid and prn ssx high or low sugar. 100 Each 3   • Blood Glucose Test Strips Test strips order: Test strips for Abbott [place] Lite meter. Sig: use bid and prn ssx high or low sugar 100 Strip 4   • glucose blood (FREESTYLE LITE) strip USE 1 STRIP TWICE DAILY  AND AS NEEDED FOR SIGNS/SYMPTOMS OF HIGH OR LOW SUGAR 100 Strip 3   • Blood Glucose Monitoring Suppl Supplies Misc Test strips order: Test strips for Abbott [place] Lite meter. Sig: use bid  and prn ssx high or low sugar (Patient taking differently: Test strips order: Test strips for Abbott [place] Lite meter. Sig: use bid  and prn ssx high or low sugar) 100 Each 0   • Blood Glucose Monitoring Suppl Device Meter: Dispense Abbott [place] Lite meter. Sig use BID and prn symptoms of low sugar . 1 Device 2   • Blood Glucose Monitoring Suppl (FREESTYLE [place] LITE) w/Device Kit USE AS DIRECTED FOR BLOOD SUGAR MONITORING. 1 Kit 0   • Misc. Devices MISC by Does not apply route. DIABETIC TEST STRIPS AND LANCETS    SIG:  USE AS DIRECTED 3 Each 11     No current facility-administered medications for this visit.     She  has a past medical history of Diabetes and Hyperlipidemia.    She has no past medical history of Encounter for long-term (current) use of other medications.  She  has a past surgical history that includes laparoscopy; tonsillectomy; and cholecystectomy.  Social History     Tobacco Use   • Smoking status: Never Smoker   • Smokeless tobacco: Never Used   Vaping Use   • Vaping Use: Never used   Substance Use Topics   • Alcohol use: Yes     Alcohol/week: 0.0 - 1.8 oz     Comment: occasionally   • Drug use: No     Social History     Social History Narrative   • Not on file     Family History   Problem Relation Age of Onset   • Cancer Mother         melanoma   • No Known Problems Father    • No Known Problems Sister    • Cancer Brother    • Leukemia Maternal Grandmother    • No Known Problems Maternal Grandfather    • No Known Problems Paternal Grandmother    • No Known Problems Paternal Grandfather    • No Known Problems Sister    • Lung Disease Sister    • Cancer Brother    • Diabetes Brother    • No Known Problems Brother      Family Status   Relation Name Status   • Mo   at age 62   • Fa   at  "age 85   • Sara Naranjo Alive   • Anibal Rosado    • MGMo     • MGFa     • PGMo     • PGFa     • Sis Monica Alive   • Sis Brunilda Alive   • Anibal Osorio Alive   • Anibal Gutierrez Alive   • Anibal Collazo          ROS    The pertinent  ROS findings can be seen in the HPI above.     All other systems reviewed and are negative     Objective:     /54 (BP Location: Right arm, Patient Position: Sitting, BP Cuff Size: Adult)   Pulse 98   Temp 36.1 °C (96.9 °F) (Temporal)   Resp 16   Ht 1.575 m (5' 2\")   Wt 68.6 kg (151 lb 3.2 oz)   SpO2 94%  Body mass index is 27.65 kg/m².      Physical Exam:    Constitutional: Alert, no distress.  Skin: No suspicious lesions  Eye: Equal, round and reactive, conjunctiva clear, lids normal.  ENMT: Lips without lesions, good dentition, oropharynx clear.  Neck: Trachea midline, no masses, no thyromegaly. No cervical or supraclavicular lymphadenopathy.  Respiratory: Unlabored respiratory effort, lungs clear to auscultation, no wheezes, no ronchi.  Cardiovascular: Normal S1, S2, no murmur, no edema  Abdomen: Soft, non-tender, no masses, no hepatosplenomegaly.  Monofilament testing with a 10 gram force: sensation intact: intact bilaterally  Visual Inspection: Feet without maceration, ulcers, fissures.  Pedal pulses: intact bilaterally        Assessment and Plan:   The following treatment plan was discussed      1. Type 2 diabetes mellitus with complication, without long-term current use of insulin (HCC)    We will obtain new labs to update clinical profile.  Then we will adjust therapy as needed.    - Comp Metabolic Panel; Future  - Hemoglobin A1c; Future  - Lipid Profile; Future  - Microalbumin Creat Ratio Urine - Lab Collect; Future  - CBC WITH DIFFERENTIAL; Future    2. Hypercholesteremia  Patient has been stable with current management  We will make no changes for now      3. Visit for screening mammogram    - MA-SCREENING MAMMO BILAT W/TOMOSYNTHESIS W/O " CAD; Future    4. Postmenopausal    - DS-BONE DENSITY STUDY (DEXA); Future            Instructed to Follow up in clinic or ER for worsening symptoms, difficulty breathing, lack of expected recovery, or should new symptoms or problems arise.    Followup: No follow-ups on file.

## 2022-08-05 LAB
25(OH)D3+25(OH)D2 SERPL-MCNC: 37.1 NG/ML (ref 30–100)
ALBUMIN SERPL-MCNC: 4.5 G/DL (ref 3.7–4.7)
ALBUMIN/CREAT UR: <6 MG/G CREAT (ref 0–29)
ALBUMIN/GLOB SERPL: 2.3 {RATIO} (ref 1.2–2.2)
ALP SERPL-CCNC: 75 IU/L (ref 44–121)
ALT SERPL-CCNC: 15 IU/L (ref 0–32)
AST SERPL-CCNC: 16 IU/L (ref 0–40)
BASOPHILS # BLD AUTO: 0.1 X10E3/UL (ref 0–0.2)
BASOPHILS NFR BLD AUTO: 1 %
BILIRUB SERPL-MCNC: 1 MG/DL (ref 0–1.2)
BUN SERPL-MCNC: 13 MG/DL (ref 8–27)
BUN/CREAT SERPL: 17 (ref 12–28)
CALCIUM SERPL-MCNC: 9.4 MG/DL (ref 8.7–10.3)
CHLORIDE SERPL-SCNC: 100 MMOL/L (ref 96–106)
CHOLEST SERPL-MCNC: 123 MG/DL (ref 100–199)
CO2 SERPL-SCNC: 24 MMOL/L (ref 20–29)
CREAT SERPL-MCNC: 0.76 MG/DL (ref 0.57–1)
CREAT UR-MCNC: 50.1 MG/DL
EGFRCR SERPLBLD CKD-EPI 2021: 83 ML/MIN/1.73
EOSINOPHIL # BLD AUTO: 0.2 X10E3/UL (ref 0–0.4)
EOSINOPHIL NFR BLD AUTO: 2 %
ERYTHROCYTE [DISTWIDTH] IN BLOOD BY AUTOMATED COUNT: 13.4 % (ref 11.7–15.4)
GLOBULIN SER CALC-MCNC: 2 G/DL (ref 1.5–4.5)
GLUCOSE SERPL-MCNC: 185 MG/DL (ref 65–99)
HBA1C MFR BLD: 7.7 % (ref 4.8–5.6)
HCT VFR BLD AUTO: 42 % (ref 34–46.6)
HCV AB S/CO SERPL IA: 0.2 S/CO RATIO (ref 0–0.9)
HDLC SERPL-MCNC: 41 MG/DL
HGB BLD-MCNC: 13.9 G/DL (ref 11.1–15.9)
IMM GRANULOCYTES # BLD AUTO: 0 X10E3/UL (ref 0–0.1)
IMM GRANULOCYTES NFR BLD AUTO: 0 %
LDLC SERPL CALC-MCNC: 62 MG/DL (ref 0–99)
LYMPHOCYTES # BLD AUTO: 1.8 X10E3/UL (ref 0.7–3.1)
LYMPHOCYTES NFR BLD AUTO: 25 %
MCH RBC QN AUTO: 28.9 PG (ref 26.6–33)
MCHC RBC AUTO-ENTMCNC: 33.1 G/DL (ref 31.5–35.7)
MCV RBC AUTO: 87 FL (ref 79–97)
MICROALBUMIN UR-MCNC: <3 UG/ML
MONOCYTES # BLD AUTO: 0.5 X10E3/UL (ref 0.1–0.9)
MONOCYTES NFR BLD AUTO: 7 %
NEUTROPHILS # BLD AUTO: 4.7 X10E3/UL (ref 1.4–7)
NEUTROPHILS NFR BLD AUTO: 65 %
PLATELET # BLD AUTO: 286 X10E3/UL (ref 150–450)
POTASSIUM SERPL-SCNC: 4.7 MMOL/L (ref 3.5–5.2)
PROT SERPL-MCNC: 6.5 G/DL (ref 6–8.5)
RBC # BLD AUTO: 4.81 X10E6/UL (ref 3.77–5.28)
SODIUM SERPL-SCNC: 139 MMOL/L (ref 134–144)
TRIGL SERPL-MCNC: 108 MG/DL (ref 0–149)
VLDLC SERPL CALC-MCNC: 20 MG/DL (ref 5–40)
WBC # BLD AUTO: 7.2 X10E3/UL (ref 3.4–10.8)

## 2022-08-05 NOTE — TELEPHONE ENCOUNTER
FINAL PRIOR AUTHORIZATION STATUS:    1.  Name of Medication & Dose: Metformin 1000mg ER TAB     2. Prior Auth Status: Approved through 8/3/23     3. Action Taken: Pharmacy Notified: no Patient Notified: no

## 2022-08-11 ENCOUNTER — OFFICE VISIT (OUTPATIENT)
Dept: MEDICAL GROUP | Age: 72
End: 2022-08-11
Payer: MEDICARE

## 2022-08-11 ENCOUNTER — HOSPITAL ENCOUNTER (OUTPATIENT)
Facility: MEDICAL CENTER | Age: 72
End: 2022-08-11
Attending: FAMILY MEDICINE
Payer: MEDICARE

## 2022-08-11 VITALS
DIASTOLIC BLOOD PRESSURE: 54 MMHG | TEMPERATURE: 99.3 F | HEIGHT: 62 IN | HEART RATE: 78 BPM | RESPIRATION RATE: 16 BRPM | WEIGHT: 151 LBS | SYSTOLIC BLOOD PRESSURE: 114 MMHG | BODY MASS INDEX: 27.79 KG/M2 | OXYGEN SATURATION: 95 %

## 2022-08-11 DIAGNOSIS — D48.9 NEOPLASM OF UNCERTAIN BEHAVIOR: ICD-10-CM

## 2022-08-11 LAB
FORWARD REASON: SPWHY: NORMAL
FORWARDED TO LAB: SPWHR: NORMAL
SPECIMEN SENT: SPWT1: NORMAL

## 2022-08-11 PROCEDURE — 11401 EXC TR-EXT B9+MARG 0.6-1 CM: CPT | Performed by: FAMILY MEDICINE

## 2022-08-11 ASSESSMENT — FIBROSIS 4 INDEX: FIB4 SCORE: 1.05

## 2022-08-11 NOTE — PROGRESS NOTES
This medical record contains text that has been entered with the assistance of computer voice recognition and dictation software.  Therefore, it may contain unintended errors in text, spelling, punctuation, or grammar      Chief Complaint   Patient presents with    Cyst     Biopsy         Nery Brady is a 72 y.o. female here evaluation and management of: Bleeding growing mole      HPI:     1. Neoplasm of uncertain behavior  Wilmer is a very pleasant 72-year-old female who presents to clinic with a chief complaint of having a mole that is increasing in size on her left medial ankle.  Its very irritating as well.    Current medicines (including changes today)  Current Outpatient Medications   Medication Sig Dispense Refill    vitamin D3 (CHOLECALCIFEROL) 400 UNIT Tab Take 1,000 Units by mouth every day.      metformin (GLUCOPHAGE) 1000 MG tablet Take 1 Tablet by mouth 2 times a day with meals. 180 Tablet 2    simvastatin (ZOCOR) 20 MG Tab Take 1 Tablet by mouth every evening. 90 Tablet 2    glipiZIDE (GLUCOTROL) 5 MG Tab Take 1 Tablet by mouth 2 times a day. 60 Tablet 4    lisinopril (PRINIVIL) 5 MG Tab Take 1 Tablet by mouth every day. 90 Tablet 2    Lancets Lancets order: Lancets for Abbott [place] Lite meter. Sig: use bid and prn ssx high or low sugar. 100 Each 3    Blood Glucose Test Strips Test strips order: Test strips for Abbott [place] Lite meter. Sig: use bid and prn ssx high or low sugar 100 Strip 4    glucose blood (FREESTYLE LITE) strip USE 1 STRIP TWICE DAILY AND AS NEEDED FOR SIGNS/SYMPTOMS OF HIGH OR LOW SUGAR 100 Strip 3    Blood Glucose Monitoring Suppl Device Meter: Dispense [employer] [place] Lite meter. Sig use BID and prn symptoms of low sugar . 1 Device 2    Blood Glucose Monitoring Suppl (FREESTYLE FREEDOM LITE) w/Device Kit USE AS DIRECTED FOR BLOOD SUGAR MONITORING. 1 Kit 0    Misc. Devices MISC by Does not apply route. DIABETIC TEST STRIPS AND LANCETS    SIG:  USE AS DIRECTED 3 Each 11  "    No current facility-administered medications for this visit.     She  has a past medical history of Diabetes and Hyperlipidemia.    She has no past medical history of Encounter for long-term (current) use of other medications.  She  has a past surgical history that includes laparoscopy; tonsillectomy; and cholecystectomy.  Social History     Tobacco Use    Smoking status: Never    Smokeless tobacco: Never   Vaping Use    Vaping Use: Never used   Substance Use Topics    Alcohol use: Yes     Alcohol/week: 0.0 - 1.8 oz     Comment: occasionally    Drug use: No     Social History     Social History Narrative    Not on file     Family History   Problem Relation Age of Onset    Cancer Mother         melanoma    No Known Problems Father     No Known Problems Sister     Cancer Brother     Leukemia Maternal Grandmother     No Known Problems Maternal Grandfather     No Known Problems Paternal Grandmother     No Known Problems Paternal Grandfather     No Known Problems Sister     Lung Disease Sister     Cancer Brother     Diabetes Brother     No Known Problems Brother      Family Status   Relation Name Status    Mo   at age 62    Fa   at age 85    Sis Marcella Alive    Bro Alonoz     MGMo      MGFa      PGMo      PGFa      Sis Monica Alive    Sis Gale Alive    Bro Devon Alive    Bro Matt Alive    Bro Zay          ROS    The pertinent  ROS findings can be seen in the HPI above.     All other systems reviewed and are negative     Objective:     /54 (BP Location: Right arm, Patient Position: Sitting, BP Cuff Size: Adult)   Pulse 78   Temp 37.4 °C (99.3 °F) (Temporal)   Resp 16   Ht 1.575 m (5' 2\")   Wt 68.5 kg (151 lb)   SpO2 95%  Body mass index is 27.62 kg/m².      Physical Exam:    Constitutional: Alert, no distress.  Skin:   Excision--- 6 mm, blue papule on medial aspect of left ankle, asymmetric      After informed written consent was obtained, " using Betadine for cleansing and 1% Lidocaine w- epinephrine for anesthetic, with sterile technique a 8 mm punch tool was used to obtain and excise  the lesion through dermis (full thickness) . Intent was to remove entire lesion with margins . Hemostasis was obtained by pressure and wound was   Sutured  With 3.0 Ethilon x3 Antibiotic dressing is applied, and wound care instructions provided. Be alert for any signs of cutaneous infection. The specimen is labeled and sent to pathology for evaluation. The procedure was well tolerated without complications.      Final repair length (measurement of repaired defect): 1.6cm      Assessment and Plan:   The following treatment plan was discussed      1. Neoplasm of uncertain behavior    Patient tolerated procedure well  There were no adverse events  Patient was given post procedure precautions     - Pathology Specimen; Future          Instructed to Follow up in clinic or ER for worsening symptoms, difficulty breathing, lack of expected recovery, or should new symptoms or problems arise.    Followup: Return in about 10 days (around 8/21/2022) for Suture Removal with MA.

## 2022-08-18 LAB
DX ICD CODE: NORMAL
PATH REPORT.FINAL DX SPEC: NORMAL
PATH REPORT.GROSS SPEC: NORMAL
PATH REPORT.SITE OF ORIGIN SPEC: NORMAL
PATHOLOGIST NAME: NORMAL
PAYMENT PROCEDURE: NORMAL

## 2022-08-19 ENCOUNTER — NON-PROVIDER VISIT (OUTPATIENT)
Dept: MEDICAL GROUP | Age: 72
End: 2022-08-19
Payer: MEDICARE

## 2022-08-19 DIAGNOSIS — D48.9 NEOPLASM OF UNCERTAIN BEHAVIOR: ICD-10-CM

## 2022-08-19 PROCEDURE — 99999 PR NO CHARGE: CPT | Performed by: FAMILY MEDICINE

## 2022-08-19 NOTE — PROGRESS NOTES
Nery Brady is a 72 y.o. female here for a Non-Provider Visit for Suture Removal.    Sutures were placed by Dr. Jane on date: 8/11/2022  Skin is healed: Yes  Provider notified if skin is not healed, or if there is redness, heat, pain, or drainage from incision: yes  Sutures removed.   Mastisol and steristips are placed: No    Advised to use emollient (vaseline, aquaphor, etc.) as needed, avoid peroxide and antibiotic ointment to reduce irritation.     Path report has not been reviewed by provider.  Path report has not reviewed with patient.

## 2022-10-21 DIAGNOSIS — E11.8 TYPE 2 DIABETES MELLITUS WITH COMPLICATION, WITHOUT LONG-TERM CURRENT USE OF INSULIN (HCC): ICD-10-CM

## 2022-10-21 RX ORDER — LISINOPRIL 5 MG/1
TABLET ORAL
Qty: 90 TABLET | Refills: 0 | Status: SHIPPED | OUTPATIENT
Start: 2022-10-21 | End: 2023-03-14

## 2022-10-21 RX ORDER — GLIPIZIDE 5 MG/1
5 TABLET ORAL 2 TIMES DAILY
Qty: 60 TABLET | Refills: 4 | Status: SHIPPED | OUTPATIENT
Start: 2022-10-21 | End: 2023-04-17 | Stop reason: SDUPTHER

## 2022-11-11 ENCOUNTER — PATIENT MESSAGE (OUTPATIENT)
Dept: HEALTH INFORMATION MANAGEMENT | Facility: OTHER | Age: 72
End: 2022-11-11

## 2023-03-10 DIAGNOSIS — E78.5 HYPERLIPIDEMIA: ICD-10-CM

## 2023-03-14 DIAGNOSIS — E78.5 HYPERLIPIDEMIA: ICD-10-CM

## 2023-03-14 RX ORDER — LISINOPRIL 5 MG/1
5 TABLET ORAL DAILY
Qty: 90 TABLET | Refills: 0 | Status: SHIPPED | OUTPATIENT
Start: 2023-03-14 | End: 2023-12-18 | Stop reason: SDUPTHER

## 2023-03-14 RX ORDER — LISINOPRIL 5 MG/1
TABLET ORAL
Qty: 90 TABLET | Refills: 0 | Status: SHIPPED | OUTPATIENT
Start: 2023-03-14 | End: 2023-06-27 | Stop reason: SDUPTHER

## 2023-03-14 RX ORDER — SIMVASTATIN 20 MG
20 TABLET ORAL EVERY EVENING
Qty: 90 TABLET | Refills: 2 | Status: SHIPPED | OUTPATIENT
Start: 2023-03-14 | End: 2023-12-07

## 2023-03-14 RX ORDER — SIMVASTATIN 20 MG
TABLET ORAL
Qty: 90 TABLET | Refills: 0 | Status: SHIPPED | OUTPATIENT
Start: 2023-03-14 | End: 2024-02-12 | Stop reason: SDUPTHER

## 2023-04-17 DIAGNOSIS — E11.8 TYPE 2 DIABETES MELLITUS WITH COMPLICATION, WITHOUT LONG-TERM CURRENT USE OF INSULIN (HCC): ICD-10-CM

## 2023-04-18 RX ORDER — GLIPIZIDE 5 MG/1
5 TABLET ORAL 2 TIMES DAILY
Qty: 60 TABLET | Refills: 4 | Status: SHIPPED | OUTPATIENT
Start: 2023-04-18 | End: 2023-08-18

## 2023-05-21 DIAGNOSIS — E11.8 TYPE 2 DIABETES MELLITUS WITH COMPLICATION, WITHOUT LONG-TERM CURRENT USE OF INSULIN (HCC): ICD-10-CM

## 2023-07-31 ENCOUNTER — TELEPHONE (OUTPATIENT)
Dept: MEDICAL GROUP | Age: 73
End: 2023-07-31
Payer: MEDICARE

## 2023-07-31 DIAGNOSIS — E78.00 PURE HYPERCHOLESTEROLEMIA: ICD-10-CM

## 2023-07-31 DIAGNOSIS — E55.9 VITAMIN D DEFICIENCY: ICD-10-CM

## 2023-07-31 NOTE — TELEPHONE ENCOUNTER
Patient walked in stating that her labs have . She is requesting labs for her upcoming visit. She does have some from 22 but is requesting all new lab orders.

## 2023-08-08 LAB
25(OH)D3+25(OH)D2 SERPL-MCNC: 38.7 NG/ML (ref 30–100)
ALBUMIN SERPL-MCNC: 4.3 G/DL (ref 3.8–4.8)
ALBUMIN/GLOB SERPL: 2 {RATIO} (ref 1.2–2.2)
ALP SERPL-CCNC: 84 IU/L (ref 44–121)
ALT SERPL-CCNC: 15 IU/L (ref 0–32)
AST SERPL-CCNC: 15 IU/L (ref 0–40)
BASOPHILS # BLD AUTO: 0.1 X10E3/UL (ref 0–0.2)
BASOPHILS NFR BLD AUTO: 1 %
BILIRUB SERPL-MCNC: 0.8 MG/DL (ref 0–1.2)
BUN SERPL-MCNC: 14 MG/DL (ref 8–27)
BUN/CREAT SERPL: 19 (ref 12–28)
CALCIUM SERPL-MCNC: 9.2 MG/DL (ref 8.7–10.3)
CHLORIDE SERPL-SCNC: 98 MMOL/L (ref 96–106)
CO2 SERPL-SCNC: 23 MMOL/L (ref 20–29)
CREAT SERPL-MCNC: 0.74 MG/DL (ref 0.57–1)
EGFRCR SERPLBLD CKD-EPI 2021: 85 ML/MIN/1.73
EOSINOPHIL # BLD AUTO: 0.2 X10E3/UL (ref 0–0.4)
EOSINOPHIL NFR BLD AUTO: 2 %
ERYTHROCYTE [DISTWIDTH] IN BLOOD BY AUTOMATED COUNT: 13.8 % (ref 11.7–15.4)
GLOBULIN SER CALC-MCNC: 2.2 G/DL (ref 1.5–4.5)
GLUCOSE SERPL-MCNC: 208 MG/DL (ref 70–99)
HCT VFR BLD AUTO: 41.1 % (ref 34–46.6)
HGB BLD-MCNC: 14 G/DL (ref 11.1–15.9)
IMM GRANULOCYTES # BLD AUTO: 0 X10E3/UL (ref 0–0.1)
IMM GRANULOCYTES NFR BLD AUTO: 0 %
IMMATURE CELLS  115398: NORMAL
LYMPHOCYTES # BLD AUTO: 2.5 X10E3/UL (ref 0.7–3.1)
LYMPHOCYTES NFR BLD AUTO: 29 %
MCH RBC QN AUTO: 29.9 PG (ref 26.6–33)
MCHC RBC AUTO-ENTMCNC: 34.1 G/DL (ref 31.5–35.7)
MCV RBC AUTO: 88 FL (ref 79–97)
MONOCYTES # BLD AUTO: 0.7 X10E3/UL (ref 0.1–0.9)
MONOCYTES NFR BLD AUTO: 8 %
MORPHOLOGY BLD-IMP: NORMAL
NEUTROPHILS # BLD AUTO: 5.2 X10E3/UL (ref 1.4–7)
NEUTROPHILS NFR BLD AUTO: 60 %
NRBC BLD AUTO-RTO: NORMAL %
PLATELET # BLD AUTO: 283 X10E3/UL (ref 150–450)
POTASSIUM SERPL-SCNC: 5.3 MMOL/L (ref 3.5–5.2)
PROT SERPL-MCNC: 6.5 G/DL (ref 6–8.5)
RBC # BLD AUTO: 4.68 X10E6/UL (ref 3.77–5.28)
SODIUM SERPL-SCNC: 137 MMOL/L (ref 134–144)
T3FREE SERPL-MCNC: 2.8 PG/ML (ref 2–4.4)
T4 FREE SERPL-MCNC: 1.46 NG/DL (ref 0.82–1.77)
TSH SERPL DL<=0.005 MIU/L-ACNC: 3.46 UIU/ML (ref 0.45–4.5)
WBC # BLD AUTO: 8.6 X10E3/UL (ref 3.4–10.8)

## 2023-08-18 DIAGNOSIS — E11.8 TYPE 2 DIABETES MELLITUS WITH COMPLICATION, WITHOUT LONG-TERM CURRENT USE OF INSULIN (HCC): ICD-10-CM

## 2023-08-18 RX ORDER — GLIPIZIDE 5 MG/1
5 TABLET ORAL 2 TIMES DAILY
Qty: 60 TABLET | Refills: 0 | Status: SHIPPED | OUTPATIENT
Start: 2023-08-18 | End: 2023-10-10

## 2023-08-18 NOTE — TELEPHONE ENCOUNTER
Received request via: Pharmacy    Was the patient seen in the last year in this department? No  LOV: 8/11/2022  Does the patient have an active prescription (recently filled or refills available) for medication(s) requested? No    Does the patient have assisted Plus and need 100 day supply (blood pressure, diabetes and cholesterol meds only)? Patient does not have SCP

## 2023-12-07 ENCOUNTER — OFFICE VISIT (OUTPATIENT)
Dept: MEDICAL GROUP | Age: 73
End: 2023-12-07
Payer: MEDICARE

## 2023-12-07 ENCOUNTER — HOSPITAL ENCOUNTER (OUTPATIENT)
Facility: MEDICAL CENTER | Age: 73
End: 2023-12-07
Attending: FAMILY MEDICINE
Payer: MEDICARE

## 2023-12-07 VITALS
TEMPERATURE: 97.7 F | SYSTOLIC BLOOD PRESSURE: 126 MMHG | DIASTOLIC BLOOD PRESSURE: 66 MMHG | WEIGHT: 150 LBS | HEIGHT: 62 IN | OXYGEN SATURATION: 96 % | HEART RATE: 84 BPM | BODY MASS INDEX: 27.6 KG/M2

## 2023-12-07 DIAGNOSIS — Z78.0 POSTMENOPAUSAL STATUS (AGE-RELATED) (NATURAL): ICD-10-CM

## 2023-12-07 DIAGNOSIS — E11.8 TYPE 2 DIABETES MELLITUS WITH COMPLICATION, WITHOUT LONG-TERM CURRENT USE OF INSULIN (HCC): ICD-10-CM

## 2023-12-07 DIAGNOSIS — Z12.31 ENCOUNTER FOR SCREENING MAMMOGRAM FOR BREAST CANCER: ICD-10-CM

## 2023-12-07 DIAGNOSIS — N95.1 MENOPAUSAL STATE: ICD-10-CM

## 2023-12-07 DIAGNOSIS — L85.9 HYPERKERATOSIS: ICD-10-CM

## 2023-12-07 DIAGNOSIS — E78.00 PURE HYPERCHOLESTEROLEMIA: ICD-10-CM

## 2023-12-07 LAB
FORWARD REASON: SPWHY: NORMAL
FORWARDED TO LAB: SPWHR: NORMAL
HBA1C MFR BLD: 7.9 % (ref ?–5.8)
POCT INT CON NEG: NEGATIVE
POCT INT CON POS: POSITIVE
RETINAL SCREEN: NEGATIVE
SPECIMEN SENT: SPWT1: NORMAL

## 2023-12-07 PROCEDURE — 83036 HEMOGLOBIN GLYCOSYLATED A1C: CPT | Performed by: FAMILY MEDICINE

## 2023-12-07 PROCEDURE — 3074F SYST BP LT 130 MM HG: CPT | Performed by: FAMILY MEDICINE

## 2023-12-07 PROCEDURE — 92250 FUNDUS PHOTOGRAPHY W/I&R: CPT | Mod: TC | Performed by: FAMILY MEDICINE

## 2023-12-07 PROCEDURE — 3078F DIAST BP <80 MM HG: CPT | Performed by: FAMILY MEDICINE

## 2023-12-07 PROCEDURE — 99214 OFFICE O/P EST MOD 30 MIN: CPT | Mod: 25 | Performed by: FAMILY MEDICINE

## 2023-12-07 PROCEDURE — 11055 PARING/CUTG B9 HYPRKER LES 1: CPT | Performed by: FAMILY MEDICINE

## 2023-12-07 RX ORDER — SEMAGLUTIDE 0.68 MG/ML
0.5 INJECTION, SOLUTION SUBCUTANEOUS
Qty: 3 ML | Refills: 0 | Status: SHIPPED | OUTPATIENT
Start: 2023-12-07

## 2023-12-07 ASSESSMENT — FIBROSIS 4 INDEX: FIB4 SCORE: 1.01

## 2023-12-07 ASSESSMENT — PATIENT HEALTH QUESTIONNAIRE - PHQ9: CLINICAL INTERPRETATION OF PHQ2 SCORE: 0

## 2023-12-07 NOTE — PROGRESS NOTES
"This medical record contains text that has been entered with the assistance of computer voice recognition and dictation software.  Therefore, it may contain unintended errors in text, spelling, punctuation, or grammar      Chief Complaint   Patient presents with    Diabetes Follow-up    Foot Problem     PT says she has a \"corn\" on her right foot.          Nery Brady is a 73 y.o. female here evaluation and management of: Diabetes, corn      HPI:           1. Type 2 diabetes mellitus with complication, without long-term current use of insulin (HCC)  Metformin 1000 mg p.o. twice daily  Glipizide 5 mg p.o. twice daily    Overall the patient has been doing well, she has been reluctant to try Ozempic in the past because she has been controlled with these meds.  She denies any polydipsia, no polyphagia, no polyuria, no numbness or tingling, no unintentional weight loss      2. Hyperkeratosis  The patient states that she has a corn on the lateral aspect of her right foot at the base of the fifth toe.  It is uncomfortable and she will like to have this removed.        Current medicines (including changes today)  Current Outpatient Medications   Medication Sig Dispense Refill    Semaglutide,0.25 or 0.5MG/DOS, (OZEMPIC, 0.25 OR 0.5 MG/DOSE,) 2 MG/3ML Solution Pen-injector Inject 0.5 mg under the skin every 7 days. 3 mL 0    glipiZIDE (GLUCOTROL) 5 MG Tab Take 1 tablet by mouth twice daily 180 Tablet 1    metformin (GLUCOPHAGE) 1000 MG tablet Take 1 Tablet by mouth 2 times a day with meals. 180 Tablet 2    simvastatin (ZOCOR) 20 MG Tab TAKE 1 TABLET BY MOUTH ONCE DAILY IN THE EVENING 90 Tablet 0    lisinopril (PRINIVIL) 5 MG Tab Take 1 Tablet by mouth every day. 90 Tablet 0    vitamin D3 (CHOLECALCIFEROL) 400 UNIT Tab Take 1,000 Units by mouth every day.      Lancets Lancets order: Lancets for Abbott [place] Lite meter. Sig: use bid and prn ssx high or low sugar. 100 Each 3    Blood Glucose Test Strips Test strips " order: Test strips for Abbott [place] Lite meter. Sig: use bid and prn ssx high or low sugar 100 Strip 4    glucose blood (FREESTYLE LITE) strip USE 1 STRIP TWICE DAILY AND AS NEEDED FOR SIGNS/SYMPTOMS OF HIGH OR LOW SUGAR 100 Strip 3    Blood Glucose Monitoring Suppl Device Meter: Dispense Abbott [place] Lite meter. Sig use BID and prn symptoms of low sugar . 1 Device 2    Blood Glucose Monitoring Suppl (FREESTYLE FREEDOM LITE) w/Device Kit USE AS DIRECTED FOR BLOOD SUGAR MONITORING. 1 Kit 0    Misc. Devices MISC by Does not apply route. DIABETIC TEST STRIPS AND LANCETS    SIG:  USE AS DIRECTED 3 Each 11     No current facility-administered medications for this visit.     She  has a past medical history of Diabetes and Hyperlipidemia.    She has no past medical history of Encounter for long-term (current) use of other medications.  She  has a past surgical history that includes laparoscopy; tonsillectomy; and cholecystectomy.  Social History     Tobacco Use    Smoking status: Never    Smokeless tobacco: Never   Vaping Use    Vaping Use: Never used   Substance Use Topics    Alcohol use: Yes     Alcohol/week: 0.0 - 1.8 oz     Comment: occasionally    Drug use: No     Social History     Social History Narrative    Not on file     Family History   Problem Relation Age of Onset    Cancer Mother         melanoma    No Known Problems Father     No Known Problems Sister     Cancer Brother     Leukemia Maternal Grandmother     No Known Problems Maternal Grandfather     No Known Problems Paternal Grandmother     No Known Problems Paternal Grandfather     No Known Problems Sister     Lung Disease Sister     Cancer Brother     Diabetes Brother     No Known Problems Brother      Family Status   Relation Name Status    Mo   at age 62    Fa   at age 85    Sara Naranjo Alive    Anibal Rosado     MGMo      MGFa      PGMo      PGFa      Sis Monica Alive    Sis Brunilda Alive    Anibal Osorio  "Alive    Anibal Gutierrez Alive    Anibal Collazo          ROS    The pertinent  ROS findings can be seen in the HPI above.     All other systems reviewed and are negative     Objective:     /66 (BP Location: Left arm, Patient Position: Sitting, BP Cuff Size: Adult)   Pulse 84   Temp 36.5 °C (97.7 °F) (Temporal)   Ht 1.575 m (5' 2\")   Wt 68 kg (150 lb)   SpO2 96%  Body mass index is 27.44 kg/m².      Physical Exam:    Constitutional: Alert, no distress.  Skin:    Right foot reveals 1 cm raised hyperkeratosis  PROCEDURE NOTE, SHAVE EXCISION    Indication: pain/bleeding over skin growth      History of allergy to iodine: NO     The risks (including bleeding and infection) and benefits of the procedure and written informed consent obtained.    Using a dermablade i shaved beneath the lesion to adipose tissue and removed.     Final length of Excision--1cm    The wound stopped bleeding and a sterile dressing applied.  The specimen was  sent for pathologic examination.    The patient tolerated the procedure well and without apparent complications.    The patient was instructed to keep the wound dry and covered for 24-48h and clean thereafter, and warning signs of infection were reviewed.    Recommended that the patient use OTC analgesics as needed for pain.  Followup sooner for any concerns.    Eye: Equal, round and reactive, conjunctiva clear, lids normal.  ENMT: Lips without lesions, good dentition, oropharynx clear.  Neck: Trachea midline, no masses, no thyromegaly. No cervical or supraclavicular lymphadenopathy.  Respiratory: Unlabored respiratory effort, lungs clear to auscultation, no wheezes, no ronchi.  Cardiovascular: Normal S1, S2, no murmur, no edema  Abdomen: Soft, non-tender, no masses, no hepatosplenomegaly.        Assessment and Plan:   The following treatment plan was discussed    All recent labs and provider notes reviewed    1. Type 2 diabetes mellitus with complication, without long-term current " use of insulin (HCC)    We discussed that the subcutaneous preparation of semaglutide has been shown to reduce major adverse cardiovascular outcomes (driven by a reduction in nonfatal stroke).    - POCT Hemoglobin A1C  - POCT Microalbumin Creat Ratio Urine; Future  - POCT Retinal Eye Exam  - Diabetic Monofilament LE Exam  - Semaglutide,0.25 or 0.5MG/DOS, (OZEMPIC, 0.25 OR 0.5 MG/DOSE,) 2 MG/3ML Solution Pen-injector; Inject 0.5 mg under the skin every 7 days.  Dispense: 3 mL; Refill: 0  - HEMOGLOBIN A1C; Future    2. Hyperkeratosis    Patient tolerated procedure well  There were no adverse events  Patient was given post procedure precautions       3. Pure hypercholesterolemia  - CBC WITH DIFFERENTIAL; Future  - Comp Metabolic Panel; Future  - Lipid Profile; Future  - HEMOGLOBIN A1C; Future  - TSH+FREE T4  - T3 FREE; Future    5. Postmenopausal status (age-related) (natural)  - DS-BONE DENSITY STUDY (DEXA); Future    6. Menopausal state  - DS-BONE DENSITY STUDY (DEXA); Future    7. Encounter for screening mammogram for breast cancer  - MA-SCREENING MAMMO BILAT W/CAD; Future             Instructed to Follow up in clinic or ER for worsening symptoms, difficulty breathing, lack of expected recovery, or should new symptoms or problems arise.    Followup: Return in about 6 months (around 6/7/2024) for Reevaluation.

## 2023-12-09 LAB
ALBUMIN/CREAT UR: 7 MG/G CREAT (ref 0–29)
CREAT UR-MCNC: 91.4 MG/DL
MICROALBUMIN UR-MCNC: 6.5 UG/ML

## 2023-12-18 DIAGNOSIS — E11.8 TYPE 2 DIABETES MELLITUS WITH COMPLICATION, WITHOUT LONG-TERM CURRENT USE OF INSULIN (HCC): ICD-10-CM

## 2023-12-21 RX ORDER — LISINOPRIL 5 MG/1
5 TABLET ORAL DAILY
Qty: 90 TABLET | Refills: 0 | Status: SHIPPED | OUTPATIENT
Start: 2023-12-21 | End: 2024-03-19

## 2024-02-12 DIAGNOSIS — E78.5 HYPERLIPIDEMIA: ICD-10-CM

## 2024-02-13 RX ORDER — SIMVASTATIN 20 MG
20 TABLET ORAL EVERY EVENING
Qty: 90 TABLET | Refills: 2 | Status: SHIPPED | OUTPATIENT
Start: 2024-02-13

## 2024-03-19 RX ORDER — LISINOPRIL 5 MG/1
5 TABLET ORAL DAILY
Qty: 90 TABLET | Refills: 0 | Status: SHIPPED | OUTPATIENT
Start: 2024-03-19 | End: 2024-03-20 | Stop reason: SDUPTHER

## 2024-03-20 RX ORDER — LISINOPRIL 5 MG/1
5 TABLET ORAL DAILY
Qty: 90 TABLET | Refills: 0 | Status: CANCELLED | OUTPATIENT
Start: 2024-03-20

## 2024-03-21 RX ORDER — LISINOPRIL 5 MG/1
5 TABLET ORAL DAILY
Qty: 90 TABLET | Refills: 2 | Status: SHIPPED | OUTPATIENT
Start: 2024-03-21

## 2024-04-16 DIAGNOSIS — E11.8 TYPE 2 DIABETES MELLITUS WITH COMPLICATION, WITHOUT LONG-TERM CURRENT USE OF INSULIN (HCC): ICD-10-CM

## 2024-04-16 NOTE — TELEPHONE ENCOUNTER
Received request via: Pharmacy    Was the patient seen in the last year in this department? Yes    Does the patient have an active prescription (recently filled or refills available) for medication(s) requested? No    Pharmacy Name: [facility]    Does the patient have nursing home Plus and need 100 day supply (blood pressure, diabetes and cholesterol meds only)? Patient does not have SCP

## 2024-04-19 RX ORDER — GLIPIZIDE 5 MG/1
5 TABLET ORAL 2 TIMES DAILY
Qty: 180 TABLET | Refills: 0 | Status: SHIPPED | OUTPATIENT
Start: 2024-04-19

## 2024-06-27 ENCOUNTER — OFFICE VISIT (OUTPATIENT)
Dept: MEDICAL GROUP | Age: 74
End: 2024-06-27
Payer: MEDICARE

## 2024-06-27 VITALS
TEMPERATURE: 96.8 F | DIASTOLIC BLOOD PRESSURE: 70 MMHG | BODY MASS INDEX: 27.23 KG/M2 | OXYGEN SATURATION: 95 % | HEIGHT: 62 IN | HEART RATE: 78 BPM | SYSTOLIC BLOOD PRESSURE: 110 MMHG | WEIGHT: 148 LBS

## 2024-06-27 DIAGNOSIS — E78.00 PURE HYPERCHOLESTEROLEMIA: ICD-10-CM

## 2024-06-27 DIAGNOSIS — E11.9 TYPE 2 DIABETES MELLITUS WITHOUT COMPLICATION, WITHOUT LONG-TERM CURRENT USE OF INSULIN (HCC): ICD-10-CM

## 2024-06-27 DIAGNOSIS — I10 ESSENTIAL HYPERTENSION: ICD-10-CM

## 2024-06-27 DIAGNOSIS — E55.9 VITAMIN D DEFICIENCY: ICD-10-CM

## 2024-06-27 DIAGNOSIS — L82.0 INFLAMED SEBORRHEIC KERATOSIS: ICD-10-CM

## 2024-06-27 LAB
HBA1C MFR BLD: 8 % (ref ?–5.8)
POCT INT CON NEG: NEGATIVE
POCT INT CON POS: POSITIVE

## 2024-06-27 ASSESSMENT — FIBROSIS 4 INDEX: FIB4 SCORE: 1.03

## 2024-06-27 ASSESSMENT — PATIENT HEALTH QUESTIONNAIRE - PHQ9: CLINICAL INTERPRETATION OF PHQ2 SCORE: 0

## 2024-06-27 NOTE — PROGRESS NOTES
This medical record contains text that has been entered with the assistance of computer voice recognition and dictation software.  Therefore, it may contain unintended errors in text, spelling, punctuation, or grammar      Chief Complaint   Patient presents with    Diabetes Follow-up     DM FOLLOW UP         Nery Brady is a 74 y.o. female here evaluation and management of: routine follow up      HPI:       History of Present Illness  The patient is a 74-year-old female who presents for evaluation of multiple medical concerns.    The patient reports a general sense of well-being. She engages in regular exercise and engages in [employer], a role she finds beneficial. She resides on the third-floor apartment. 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 Other than that, she primarily spent time at home. She is currently in her second year of [employer], which she finds beneficial.    The patient reports the presence of a bump on her head.    The patient also reports a black moses on her back, which has since peeled.    The patient experienced a trigger finger a few months ago, which continues to lock slightly. She sought treatment at [place], where she received a cortisone injection in the center of her finger. She experienced pain and triggering at that time.    Supplemental Information  She is taking simvastatin.          1. Type 2 diabetes mellitus without complication, without long-term current use of insulin (HCC)  Metformin 1000 mg p.o. twice daily  Glipizide 5 mg p.o. twice daily    Patient cannot afford Ozempic.  She denies any polydipsia, no polyuria, no polyphagia no numbness or tingling.    2. Essential hypertension  Lisinopril 5 mg p.o. daily      Overall the patient has been compliant with his medical regimen, denies any adverse side effects such as cough, no syncope, no presyncope, no excessive fatigue.  The patient denies any chest pain today no  headaches.      3. Inflamed seborrheic keratosis  Patient has couple of flaky spots 1 on her scalp that is itchy as well on her back.        Current medicines (including changes today)  Current Outpatient Medications   Medication Sig Dispense Refill    CALCIUM PO Take  by mouth.      glipiZIDE (GLUCOTROL) 5 MG Tab Take 1 tablet by mouth twice daily 180 Tablet 0    lisinopril (PRINIVIL) 5 MG Tab Take 1 Tablet by mouth every day. 90 Tablet 2    simvastatin (ZOCOR) 20 MG Tab Take 1 Tablet by mouth every evening. 90 Tablet 2    metformin (GLUCOPHAGE) 1000 MG tablet Take 1 Tablet by mouth 2 times a day with meals. 180 Tablet 2    vitamin D3 (CHOLECALCIFEROL) 400 UNIT Tab Take 1,000 Units by mouth every day.      Lancets Lancets order: Lancets for Abbott [place] Lite meter. Sig: use bid and prn ssx high or low sugar. 100 Each 3    Blood Glucose Test Strips Test strips order: Test strips for Abbott [place] Lite meter. Sig: use bid and prn ssx high or low sugar 100 Strip 4    glucose blood (FREESTYLE LITE) strip USE 1 STRIP TWICE DAILY AND AS NEEDED FOR SIGNS/SYMPTOMS OF HIGH OR LOW SUGAR 100 Strip 3    Blood Glucose Monitoring Suppl Device Meter: Dispense [employer] [place] Lite meter. Sig use BID and prn symptoms of low sugar . 1 Device 2    Blood Glucose Monitoring Suppl (FREESTYLE [place] LITE) w/Device Kit USE AS DIRECTED FOR BLOOD SUGAR MONITORING. 1 Kit 0    Misc. Devices MISC by Does not apply route. DIABETIC TEST STRIPS AND LANCETS    SIG:  USE AS DIRECTED 3 Each 11     No current facility-administered medications for this visit.     She  has a past medical history of Diabetes and Hyperlipidemia.  She  has a past surgical history that includes laparoscopy; tonsillectomy; and cholecystectomy.  Social History     Tobacco Use    Smoking status: Never    Smokeless tobacco: Never   Vaping Use    Vaping status: Never Used   Substance Use Topics    Alcohol use: Yes     Alcohol/week: 0.0 - 1.8 oz     Comment: occasionally     "Drug use: No     Social History     Social History Narrative    Not on file     Family History   Problem Relation Age of Onset    Cancer Mother         melanoma    No Known Problems Father     No Known Problems Sister     Cancer Brother     Leukemia Maternal Grandmother     No Known Problems Maternal Grandfather     No Known Problems Paternal Grandmother     No Known Problems Paternal Grandfather     No Known Problems Sister     Lung Disease Sister     Cancer Brother     Diabetes Brother     No Known Problems Brother      Family Status   Relation Name Status    Mo   at age 62    Fa   at age 85    Sis Marcella Alive    Bro Alonzo     MGMo      MGFa      PGMo      PGFa      Sis Monica Alive    Sis Gale Alive    Bro Devon Alive    Bro Matt Alive    Bro Zay          ROS    The pertinent  ROS findings can be seen in the HPI above.     All other systems reviewed and are negative     Objective:     /70 (BP Location: Right arm, Patient Position: Sitting, BP Cuff Size: Adult)   Pulse 78   Temp 36 °C (96.8 °F) (Temporal)   Ht 1.575 m (5' 2\")   Wt 67.1 kg (148 lb)   SpO2 95%  Body mass index is 27.07 kg/m².      Physical Exam:    Constitutional: Alert, no distress.  Skin:   SKIN EXAM    ISK  Description--2 irregular, verrucous plaques 1 on the scalp measuring about 1.2 cm  A second verrucous plaque noted on back on the bra line measuring about 2 cm irregular        PROCEDURE: CRYOTHERAPY  Discussed risks and benefits of cryotherapy including but not limited to scarring, hyperpigmentation, hypopigmentation, hypertrophic scarring, keiloid scarring, incomplete or no resolution of lesions treated,pain, undesirable cosemetic result, blistering, potential need for additional treatment including more invasive treatment. Patient expresses understanding and verbally acknowledges risks and consent to treatment. 2  applications of cryotherapy with 3 second freeze " thaw cycle was applied to the above lesions.  Patient tolerated procedure well. There were no complications. Aftercare instructions given.    Eye: Equal, round and reactive, conjunctiva clear, lids normal.  ENMT: Lips without lesions, good dentition, oropharynx clear.  Neck: Trachea midline, no masses, no thyromegaly. No cervical or supraclavicular lymphadenopathy.  Respiratory: Unlabored respiratory effort, lungs clear to auscultation, no wheezes, no ronchi.  Cardiovascular: Normal S1, S2, no murmur, no edema  Abdomen: Soft, non-tender, no masses, no hepatosplenomegaly.        Assessment and Plan:   The following treatment plan was discussed    All recent labs and provider notes reviewed    1. Type 2 diabetes mellitus without complication, without long-term current use of insulin (HCC)    We will obtain new labs to update clinical profile.  Then we will adjust therapy as needed.    - POCT Hemoglobin A1C  - CBC WITH DIFFERENTIAL; Future  - Comp Metabolic Panel; Future  - Lipid Profile; Future  - TSH+FREE T4  - T3 FREE; Future  - VITAMIN D,25 HYDROXY (DEFICIENCY); Future    2. Essential hypertension    Patient has been stable with current management  We will make no changes for now    3. Inflamed seborrheic keratosis    Patient tolerated procedure well  There were no adverse events  Patient was given post procedure precautions     4. Pure hypercholesterolemia  - CBC WITH DIFFERENTIAL; Future  - Comp Metabolic Panel; Future  - Lipid Profile; Future  - TSH+FREE T4  - T3 FREE; Future  - VITAMIN D,25 HYDROXY (DEFICIENCY); Future    5. Vitamin D deficiency  - VITAMIN D,25 HYDROXY (DEFICIENCY); Future    Other orders  - CALCIUM PO; Take  by mouth.             Instructed to Follow up in clinic or ER for worsening symptoms, difficulty breathing, lack of expected recovery, or should new symptoms or problems arise.    Followup: Return in about 4 weeks (around 7/25/2024) for EXCISION.

## 2024-07-22 DIAGNOSIS — E11.8 TYPE 2 DIABETES MELLITUS WITH COMPLICATION, WITHOUT LONG-TERM CURRENT USE OF INSULIN (HCC): ICD-10-CM

## 2024-07-23 DIAGNOSIS — E11.8 TYPE 2 DIABETES MELLITUS WITH COMPLICATION, WITHOUT LONG-TERM CURRENT USE OF INSULIN (HCC): ICD-10-CM

## 2024-07-24 RX ORDER — GLIPIZIDE 5 MG/1
5 TABLET ORAL 2 TIMES DAILY
Qty: 180 TABLET | Refills: 0 | OUTPATIENT
Start: 2024-07-24

## 2024-07-24 RX ORDER — GLIPIZIDE 5 MG/1
5 TABLET ORAL 2 TIMES DAILY
Qty: 180 TABLET | Refills: 2 | Status: SHIPPED | OUTPATIENT
Start: 2024-07-24

## 2024-08-14 ENCOUNTER — APPOINTMENT (OUTPATIENT)
Dept: MEDICAL GROUP | Age: 74
End: 2024-08-14
Payer: MEDICARE

## 2024-10-22 ENCOUNTER — APPOINTMENT (OUTPATIENT)
Dept: RADIOLOGY | Facility: MEDICAL CENTER | Age: 74
End: 2024-10-22
Attending: FAMILY MEDICINE
Payer: MEDICARE

## 2024-10-22 DIAGNOSIS — Z12.31 ENCOUNTER FOR SCREENING MAMMOGRAM FOR BREAST CANCER: ICD-10-CM

## 2024-10-22 PROCEDURE — 77067 SCR MAMMO BI INCL CAD: CPT

## 2024-10-24 ENCOUNTER — HOSPITAL ENCOUNTER (OUTPATIENT)
Dept: RADIOLOGY | Facility: MEDICAL CENTER | Age: 74
End: 2024-10-24
Payer: MEDICARE

## 2024-10-30 ENCOUNTER — HOSPITAL ENCOUNTER (OUTPATIENT)
Dept: LAB | Facility: MEDICAL CENTER | Age: 74
End: 2024-10-30
Attending: FAMILY MEDICINE
Payer: MEDICARE

## 2024-10-30 DIAGNOSIS — E11.8 TYPE 2 DIABETES MELLITUS WITH COMPLICATION, WITHOUT LONG-TERM CURRENT USE OF INSULIN (HCC): ICD-10-CM

## 2024-10-30 DIAGNOSIS — E78.00 PURE HYPERCHOLESTEROLEMIA: ICD-10-CM

## 2024-10-30 DIAGNOSIS — E55.9 VITAMIN D DEFICIENCY: ICD-10-CM

## 2024-10-30 DIAGNOSIS — E11.9 TYPE 2 DIABETES MELLITUS WITHOUT COMPLICATION, WITHOUT LONG-TERM CURRENT USE OF INSULIN (HCC): ICD-10-CM

## 2024-10-30 LAB
25(OH)D3 SERPL-MCNC: 41 NG/ML (ref 30–100)
ALBUMIN SERPL BCP-MCNC: 4.3 G/DL (ref 3.2–4.9)
ALBUMIN/GLOB SERPL: 1.7 G/DL
ALP SERPL-CCNC: 81 U/L (ref 30–99)
ALT SERPL-CCNC: 16 U/L (ref 2–50)
ANION GAP SERPL CALC-SCNC: 12 MMOL/L (ref 7–16)
AST SERPL-CCNC: 13 U/L (ref 12–45)
BASOPHILS # BLD AUTO: 0.7 % (ref 0–1.8)
BASOPHILS # BLD: 0.06 K/UL (ref 0–0.12)
BILIRUB SERPL-MCNC: 0.9 MG/DL (ref 0.1–1.5)
BUN SERPL-MCNC: 16 MG/DL (ref 8–22)
CALCIUM ALBUM COR SERPL-MCNC: 9.5 MG/DL (ref 8.5–10.5)
CALCIUM SERPL-MCNC: 9.7 MG/DL (ref 8.5–10.5)
CHLORIDE SERPL-SCNC: 99 MMOL/L (ref 96–112)
CHOLEST SERPL-MCNC: 137 MG/DL (ref 100–199)
CO2 SERPL-SCNC: 24 MMOL/L (ref 20–33)
CREAT SERPL-MCNC: 0.71 MG/DL (ref 0.5–1.4)
EOSINOPHIL # BLD AUTO: 0.22 K/UL (ref 0–0.51)
EOSINOPHIL NFR BLD: 2.7 % (ref 0–6.9)
ERYTHROCYTE [DISTWIDTH] IN BLOOD BY AUTOMATED COUNT: 45.5 FL (ref 35.9–50)
EST. AVERAGE GLUCOSE BLD GHB EST-MCNC: 174 MG/DL
GFR SERPLBLD CREATININE-BSD FMLA CKD-EPI: 88 ML/MIN/1.73 M 2
GLOBULIN SER CALC-MCNC: 2.6 G/DL (ref 1.9–3.5)
GLUCOSE SERPL-MCNC: 227 MG/DL (ref 65–99)
HBA1C MFR BLD: 7.7 % (ref 4–5.6)
HCT VFR BLD AUTO: 41.1 % (ref 37–47)
HDLC SERPL-MCNC: 45 MG/DL
HGB BLD-MCNC: 14 G/DL (ref 12–16)
IMM GRANULOCYTES # BLD AUTO: 0.03 K/UL (ref 0–0.11)
IMM GRANULOCYTES NFR BLD AUTO: 0.4 % (ref 0–0.9)
LDLC SERPL CALC-MCNC: 71 MG/DL
LYMPHOCYTES # BLD AUTO: 2.09 K/UL (ref 1–4.8)
LYMPHOCYTES NFR BLD: 26.1 % (ref 22–41)
MCH RBC QN AUTO: 29.6 PG (ref 27–33)
MCHC RBC AUTO-ENTMCNC: 34.1 G/DL (ref 32.2–35.5)
MCV RBC AUTO: 86.9 FL (ref 81.4–97.8)
MONOCYTES # BLD AUTO: 0.55 K/UL (ref 0–0.85)
MONOCYTES NFR BLD AUTO: 6.9 % (ref 0–13.4)
NEUTROPHILS # BLD AUTO: 5.06 K/UL (ref 1.82–7.42)
NEUTROPHILS NFR BLD: 63.2 % (ref 44–72)
NRBC # BLD AUTO: 0 K/UL
NRBC BLD-RTO: 0 /100 WBC (ref 0–0.2)
PLATELET # BLD AUTO: 324 K/UL (ref 164–446)
PMV BLD AUTO: 10.8 FL (ref 9–12.9)
POTASSIUM SERPL-SCNC: 4.3 MMOL/L (ref 3.6–5.5)
PROT SERPL-MCNC: 6.9 G/DL (ref 6–8.2)
RBC # BLD AUTO: 4.73 M/UL (ref 4.2–5.4)
SODIUM SERPL-SCNC: 135 MMOL/L (ref 135–145)
T3FREE SERPL-MCNC: 3.27 PG/ML (ref 2–4.4)
T4 FREE SERPL-MCNC: 1.5 NG/DL (ref 0.93–1.7)
TRIGL SERPL-MCNC: 107 MG/DL (ref 0–149)
TSH SERPL-ACNC: 2.03 UIU/ML (ref 0.35–5.5)
WBC # BLD AUTO: 8 K/UL (ref 4.8–10.8)

## 2024-10-30 PROCEDURE — 83036 HEMOGLOBIN GLYCOSYLATED A1C: CPT

## 2024-10-30 PROCEDURE — 36415 COLL VENOUS BLD VENIPUNCTURE: CPT

## 2024-10-30 PROCEDURE — 82306 VITAMIN D 25 HYDROXY: CPT

## 2024-10-30 PROCEDURE — 84481 FREE ASSAY (FT-3): CPT

## 2024-10-30 PROCEDURE — 85025 COMPLETE CBC W/AUTO DIFF WBC: CPT

## 2024-10-30 PROCEDURE — 80061 LIPID PANEL: CPT

## 2024-10-30 PROCEDURE — 80053 COMPREHEN METABOLIC PANEL: CPT

## 2024-10-30 PROCEDURE — 84439 ASSAY OF FREE THYROXINE: CPT

## 2024-10-30 PROCEDURE — 84443 ASSAY THYROID STIM HORMONE: CPT

## 2024-10-31 DIAGNOSIS — E11.8 TYPE 2 DIABETES MELLITUS WITH COMPLICATION, WITHOUT LONG-TERM CURRENT USE OF INSULIN (HCC): ICD-10-CM

## 2024-11-05 DIAGNOSIS — E78.5 HYPERLIPIDEMIA: ICD-10-CM

## 2024-11-05 RX ORDER — SIMVASTATIN 20 MG
20 TABLET ORAL EVERY EVENING
Qty: 90 TABLET | Refills: 2 | Status: SHIPPED | OUTPATIENT
Start: 2024-11-05

## 2024-11-05 NOTE — TELEPHONE ENCOUNTER
Received request via: Pharmacy    Was the patient seen in the last year in this department? Yes    Does the patient have an active prescription (recently filled or refills available) for medication(s) requested? No    [facility]     Does the patient have [facility] Plus and need 100-day supply? (This applies to ALL medications) Patient does not have SCP

## 2024-11-26 ENCOUNTER — HOSPITAL ENCOUNTER (OUTPATIENT)
Dept: RADIOLOGY | Facility: MEDICAL CENTER | Age: 74
End: 2024-11-26
Attending: FAMILY MEDICINE
Payer: MEDICARE

## 2024-11-26 DIAGNOSIS — N95.1 MENOPAUSAL STATE: ICD-10-CM

## 2024-11-26 DIAGNOSIS — Z78.0 POSTMENOPAUSAL STATUS (AGE-RELATED) (NATURAL): ICD-10-CM

## 2024-11-26 PROCEDURE — 77080 DXA BONE DENSITY AXIAL: CPT

## 2025-03-14 RX ORDER — LISINOPRIL 5 MG/1
5 TABLET ORAL DAILY
Qty: 90 TABLET | Refills: 2 | Status: SHIPPED | OUTPATIENT
Start: 2025-03-14

## 2025-04-06 DIAGNOSIS — E11.8 TYPE 2 DIABETES MELLITUS WITH COMPLICATION, WITHOUT LONG-TERM CURRENT USE OF INSULIN (HCC): ICD-10-CM

## 2025-04-06 NOTE — TELEPHONE ENCOUNTER
Received request via: Pharmacy    Was the patient seen in the last year in this department? Yes    Does the patient have an active prescription (recently filled or refills available) for medication(s) requested? No    Pharmacy Name: [facility]    Does the patient have [facility] Plus and need 100-day supply? (This applies to ALL medications) Patient does not have SCP

## 2025-04-08 RX ORDER — GLIPIZIDE 5 MG/1
5 TABLET ORAL 2 TIMES DAILY
Qty: 180 TABLET | Refills: 0 | Status: SHIPPED | OUTPATIENT
Start: 2025-04-08

## 2025-07-05 DIAGNOSIS — E11.8 TYPE 2 DIABETES MELLITUS WITH COMPLICATION, WITHOUT LONG-TERM CURRENT USE OF INSULIN (HCC): ICD-10-CM

## 2025-07-08 RX ORDER — GLIPIZIDE 5 MG/1
5 TABLET ORAL 2 TIMES DAILY
Qty: 180 TABLET | Refills: 2 | Status: SHIPPED | OUTPATIENT
Start: 2025-07-08

## 2025-08-02 DIAGNOSIS — E78.5 HYPERLIPIDEMIA: ICD-10-CM

## 2025-08-05 RX ORDER — SIMVASTATIN 20 MG
20 TABLET ORAL EVERY EVENING
Qty: 90 TABLET | Refills: 0 | Status: SHIPPED | OUTPATIENT
Start: 2025-08-05